# Patient Record
Sex: FEMALE | Race: WHITE | Employment: UNEMPLOYED | URBAN - METROPOLITAN AREA
[De-identification: names, ages, dates, MRNs, and addresses within clinical notes are randomized per-mention and may not be internally consistent; named-entity substitution may affect disease eponyms.]

---

## 2021-12-27 LAB — POC BETA-HCG (QUAL): POSITIVE

## 2022-02-09 LAB
BASOPHILS # BLD AUTO: 0.01 10*3/UL (ref 0.01–0.08)
BASOPHILS NFR BLD AUTO: 0.2 % (ref 0.1–1.2)
EOSINOPHIL # BLD AUTO: 0.09 10*3/UL (ref 0.04–0.36)
EOSINOPHIL NFR BLD AUTO: 1.4 % (ref 0.7–7)
ERYTHROCYTE [DISTWIDTH] IN BLOOD BY AUTOMATED COUNT: 14.6 % (ref 11–14.5)
HCT VFR BLD AUTO: 38.8 % (ref 36–48)
HGB BLD-MCNC: 12.5 G/DL (ref 11.8–16)
IMM GRANULOCYTES # BLD AUTO: 0.01 10*3/UL (ref 0–0.03)
IMM GRANULOCYTES NFR BLD AUTO: 0.2 % (ref 0–0.5)
LYMPHOCYTES # BLD AUTO: 1.59 10*3/UL (ref 1.16–3.74)
LYMPHOCYTES NFR BLD AUTO: 24.2 % (ref 20–55)
MANUAL DIFF? (OHS): NORMAL
MCH RBC QN AUTO: 26.3 PG (ref 27–34)
MCHC RBC AUTO-ENTMCNC: 32.2 G/DL (ref 31–37)
MCV RBC AUTO: 81.7 FL (ref 79–99)
MONOCYTES # BLD AUTO: 0.47 10*3/UL (ref 0.24–0.36)
MONOCYTES NFR BLD AUTO: 7.1 % (ref 4.7–12.5)
NEUTROPHILS # BLD AUTO: 4.41 10*3/UL (ref 1.56–6.13)
NEUTROPHILS NFR BLD AUTO: 66.9 % (ref 37–73)
PLATELET # BLD AUTO: 248 10*3/UL (ref 140–371)
PMV BLD AUTO: 9.7 FL (ref 9.4–12.4)
RBC # BLD AUTO: 4.75 10*6/UL (ref 4–5.1)
WBC # SPEC AUTO: 6.6 10*3/UL (ref 4–11.5)

## 2022-04-06 LAB
BASOPHILS # BLD AUTO: 0.01 10*3/UL (ref 0.01–0.08)
BASOPHILS NFR BLD AUTO: 0.2 % (ref 0.1–1.2)
EOSINOPHIL # BLD AUTO: 0.07 10*3/UL (ref 0.04–0.36)
EOSINOPHIL NFR BLD AUTO: 1.7 % (ref 0.7–7)
ERYTHROCYTE [DISTWIDTH] IN BLOOD BY AUTOMATED COUNT: 14.6 % (ref 11–14.5)
HCT VFR BLD AUTO: 32.9 % (ref 36–48)
HGB BLD-MCNC: 10.4 G/DL (ref 11.8–16)
IMM GRANULOCYTES # BLD AUTO: 0.01 10*3/UL (ref 0–0.03)
IMM GRANULOCYTES NFR BLD AUTO: 0.2 % (ref 0–0.5)
LYMPHOCYTES # BLD AUTO: 1.48 10*3/UL (ref 1.16–3.74)
LYMPHOCYTES NFR BLD AUTO: 36 % (ref 20–55)
MANUAL DIFF? (OHS): NORMAL
MCH RBC QN AUTO: 25.7 PG (ref 27–34)
MCHC RBC AUTO-ENTMCNC: 31.6 G/DL (ref 31–37)
MCV RBC AUTO: 81.4 FL (ref 79–99)
MONOCYTES # BLD AUTO: 0.27 10*3/UL (ref 0.24–0.36)
MONOCYTES NFR BLD AUTO: 6.6 % (ref 4.7–12.5)
NEUTROPHILS # BLD AUTO: 2.27 10*3/UL (ref 1.56–6.13)
NEUTROPHILS NFR BLD AUTO: 55.3 % (ref 37–73)
PLATELET # BLD AUTO: 267 10*3/UL (ref 140–371)
PMV BLD AUTO: 9.2 FL (ref 9.4–12.4)
RBC # BLD AUTO: 4.04 10*6/UL (ref 4–5.1)
WBC # SPEC AUTO: 4.1 10*3/UL (ref 4–11.5)

## 2022-04-11 ENCOUNTER — HISTORICAL (OUTPATIENT)
Dept: ADMINISTRATIVE | Facility: HOSPITAL | Age: 22
End: 2022-04-11

## 2022-04-29 VITALS
SYSTOLIC BLOOD PRESSURE: 118 MMHG | BODY MASS INDEX: 38.8 KG/M2 | DIASTOLIC BLOOD PRESSURE: 68 MMHG | HEIGHT: 63 IN | WEIGHT: 219 LBS

## 2022-05-14 ENCOUNTER — HISTORICAL (OUTPATIENT)
Dept: ADMINISTRATIVE | Facility: HOSPITAL | Age: 22
End: 2022-05-14

## 2022-05-21 NOTE — HISTORICAL [facility]
This is a historical note converted from Iveth. Formatting and pictures may have been removed.  Please reference Iveth for original formatting and attached multimedia. Chief Complaint  PRESENTS TO CLINIC 10 WEEKS AND 5 DAYS FOR OB VISIT  History of Present Illness  PRESENTS TO CLINIC 10 WEEKS AND 5 DAYS FOR OB VISIT. C/O NAUSEA AND VOMITING IN AM  LMP/EGA/RAJEEV  Gestational Age (EGA) and RAJEEV?? ? * Note: EGA calculated as of 02/09/2022  ?  RAJEEV:?09/02/2022???EGA*:?10 weeks 5 days ? ? ? ? ? ?Type:?Authoritative??????Method Date:?11/26/2021  ?  ?? ? ?Method:?Last Menstrual Period?(11/26/2021)  ?? ? ?Confirmation:?Confirmed  ?? ? ?Description:?--  ?? ? ?Comments:?--  ?? ? ?Entered by:?Hetal Platt on 01/19/2022?  ?  Other RAJEEV Calculations for this Pregnancy:  ?  ?? ? ?Method:?Ultrasound  ?? ? ?Method Date:?01/19/2022  ?? ? ?RAJEEV:?08/31/2022  ?? ? ?EGA (At Entry):?8 weeks  ?? ? ?Type:?Non-Authoritative  ?? ? ?Comments:?SINGLE LIVE IUP AT 8W0D, FHTS-169, C/W DATES BY LMP  ?? ? ?Entered by:?Pearl Ellis MD, Keith JACQUES on 01/19/2022  Gynecological History  Last Menstrual Period: 11/26/21  Menstrual Status Intake: Menarcheal  Age of Menarche: 15  STIs/STDs: No  Intermenstrual Bleeding: Yes  Current Birth Control Method: None  Dysmenorrhea: Severe  HPV Vaccine: No  Flow: Heavy  Dyspareunia: No  Duration of Flow: IRREGULAR  Postcoital Bleeding: No  Frequency of Cycle: IRREGULAR  Dysuria: No  Breast CA Hx: No  Sexually Active: Yes  Review of Systems  Pertinent findings are noted in the above HPI. All other systems were reviewed and are negative.?  Physical Exam  Vitals & Measurements  HR:?114(Peripheral)? BP:?125/70?  HT:?160.02?cm? WT:?100.300?kg?  General Appearance: healthy, well-nourished and well-developed in no acute distress.  Skin: normal  Neuro/Psych: Orientation to time, place and person. Normal mood and affect and active, alert  ?  Assessment/Plan  1.?Encounter for supervision of normal first pregnancy, first  trimester?Z34.01  ?INSTR PT TO TAKE 2 4MG ZOFRAN Q 8 HOURS. REPORTS NAUSEA IN AM WHEN SHE WAKES UP AT 6AM WHEN  LEAVES AND NAUSEA AGAIN WHEN SHE WAKES UP AROUND 8 OR 9AM, INSTR PT TO TAKE AT 10PM AT BEDTIME, AGAIN AROUND 6AM WHEN  LEAVES, TO HELP NAUSEA UPON WAKING. IF NO RELIEF CONTACT OFFICE AND WE WILL TRY ANOTHER MEDICATION.  Ordered:  Clinic Follow up, *Est. 22 3:00:00 CST, Order for future visit, Encounter for supervision of normal first pregnancy, first trimester  10 weeks gestation of pregnancy, FLORIAN TORRES  Office/Outpatient Visit Level 3 Established 21735 , Encounter for supervision of normal first pregnancy, first trimester  10 weeks gestation of pregnancy, FLORIAN TORRES , 22 14:23:00 CST  ?  2.?10 weeks gestation of pregnancy?Z3A.10  Ordered:  Clinic Follow up, *Est. 22 3:00:00 CST, Order for future visit, Encounter for supervision of normal first pregnancy, first trimester  10 weeks gestation of pregnancy, FLORIAN TORRES  Office/Outpatient Visit Level 3 Established 42000 , Encounter for supervision of normal first pregnancy, first trimester  10 weeks gestation of pregnancy, FLORIAN TORRES , 22 14:23:00 CST  ?  Referrals  Clinic Follow up, *Est. 22 3:00:00 CST, Order for future visit, Encounter for supervision of normal first pregnancy, first trimester  10 weeks gestation of pregnancy, BLAKE Haleigh TORRES    History  Pregnancy History???(0,0,0,0)?? ??  ?  ??No previous pregnancies history have been recorded  Problem List/Past Medical History  Ongoing  Encounter for pregnancy test, result positive  Encounter for supervision of normal first pregnancy, first trimester  Nausea/vomiting in pregnancy  Pregnant  Historical  Asthma  Depression  Migraine  Medications  Vitafol Gummies with DHA oral tablet, chewable, 3 tab(s), Chewed, Daily, 5 refills  Zofran ODT 4 mg oral tablet, disintegrating, 4 mg= 1 tab(s), Oral, q8hr,  PRN  Allergies  No Known Medication Allergies  Social History  Abuse/Neglect  No, No, Yes, 02/09/2022  Alcohol  Never, 02/09/2022  Sexual  Sexually active: Yes. No, 02/09/2022  Substance Use  Never, 02/09/2022  Tobacco  Never (less than 100 in lifetime), N/A, 02/09/2022  Family History  Family history is negative  Health Maintenance  Health Maintenance  ???Pending?(in the next year)  ??? ??OverDue  ??? ? ? ?Influenza Vaccine due??10/01/21??and every 1??day(s)  ??? ??Due?  ??? ? ? ?ADL Screening due??02/09/22??and every 1??year(s)  ??? ? ? ?Depression Screening due??02/09/22??Unknown Frequency  ??? ? ? ?Tetanus Vaccine due??02/09/22??and every 10??year(s)  ??? ??Refused?  ??? ? ? ?Alcohol Misuse Screening due??01/02/22??and every 1??year(s)  ??? ??Due In Future?  ??? ? ? ?Obesity Screening not due until??01/01/23??and every 1??year(s)  ???Satisfied?(in the past 1 year)  ??? ??Satisfied?  ??? ? ? ?Blood Pressure Screening on??02/09/22.??Satisfied by Sirisha Us  ??? ? ? ?Body Mass Index Check on??01/19/22.??Satisfied by Hetal Platt  ??? ? ? ?Influenza Vaccine on??12/27/21.??Satisfied by Tiara Lewis  ??? ? ? ?Obesity Screening on??02/09/22.??Satisfied by Sirisha Us  ??? ??Refused?  ??? ? ? ?Alcohol Misuse Screening on??12/27/21.??Recorded by Tiara Lewis  ?

## 2022-09-22 ENCOUNTER — HISTORICAL (OUTPATIENT)
Dept: ADMINISTRATIVE | Facility: HOSPITAL | Age: 22
End: 2022-09-22

## 2022-10-05 LAB
C TRACH DNA SPEC QL NAA+PROBE: NEGATIVE
N GONORRHOEA DNA SPEC QL NAA+PROBE: NEGATIVE

## 2022-10-06 LAB
BENZODIAZEPINES: NEGATIVE
CLARITHRO TITR SBT: NORMAL {TITER}
COCAINE (METAB.): NEGATIVE
HCV AB SERPL QL IA: NEGATIVE
MARIJUANA (THC) METABOLITE: NEGATIVE
METHADONE: NEGATIVE
OPIATES UR QL SCN: NEGATIVE
URINE CULTURE, ROUTINE: NORMAL

## 2022-11-09 LAB
ABO + RH BLD: NORMAL
EXT MATERNIT21: NEGATIVE
GLUCOSE SERPL-MCNC: 85 MG/DL
GLUCOSE SERPL-MCNC: 85 MG/DL
HBV SURFACE AG SERPL QL IA: NEGATIVE
HCT VFR BLD AUTO: 36.7 % (ref 36–46)
HGB BLD-MCNC: 11.8 G/DL (ref 12–16)
HIV-1 AND HIV-2 ANTIBODIES: NONREACTIVE
INDIRECT COOMBS: NEGATIVE
MCV RBC AUTO: 77.3 FL (ref 82–108)
PLATELET # BLD AUTO: 236 K/UL (ref 150–399)
RPR: NONREACTIVE
RUBELLA IMMUNE STATUS: NORMAL

## 2022-12-28 VITALS
SYSTOLIC BLOOD PRESSURE: 120 MMHG | DIASTOLIC BLOOD PRESSURE: 60 MMHG | BODY MASS INDEX: 40.23 KG/M2 | WEIGHT: 227.13 LBS

## 2022-12-28 LAB — GLUCOSE 1: 85

## 2022-12-28 RX ORDER — PROGESTERONE 200 MG/1
200 CAPSULE ORAL NIGHTLY
COMMUNITY
End: 2023-03-21

## 2022-12-28 RX ORDER — ASPIRIN 81 MG/1
81 TABLET ORAL DAILY
COMMUNITY
End: 2023-03-15 | Stop reason: SDUPTHER

## 2022-12-29 VITALS
SYSTOLIC BLOOD PRESSURE: 112 MMHG | WEIGHT: 224.88 LBS | DIASTOLIC BLOOD PRESSURE: 66 MMHG | BODY MASS INDEX: 39.83 KG/M2

## 2023-01-25 ENCOUNTER — ROUTINE PRENATAL (OUTPATIENT)
Dept: OBSTETRICS AND GYNECOLOGY | Facility: CLINIC | Age: 23
End: 2023-01-25
Payer: MEDICAID

## 2023-01-25 VITALS
BODY MASS INDEX: 41.63 KG/M2 | HEART RATE: 86 BPM | DIASTOLIC BLOOD PRESSURE: 70 MMHG | WEIGHT: 235 LBS | SYSTOLIC BLOOD PRESSURE: 118 MMHG

## 2023-01-25 DIAGNOSIS — Z34.82 ENCOUNTER FOR SUPERVISION OF OTHER NORMAL PREGNANCY IN SECOND TRIMESTER: Primary | ICD-10-CM

## 2023-01-25 DIAGNOSIS — O99.213 OBESITY COMPLICATING PREGNANCY IN THIRD TRIMESTER: ICD-10-CM

## 2023-01-25 DIAGNOSIS — Z3A.22 22 WEEKS GESTATION OF PREGNANCY: ICD-10-CM

## 2023-01-25 DIAGNOSIS — O34.32 INCOMPETENT CERVIX IN PREGNANCY, ANTEPARTUM, SECOND TRIMESTER: ICD-10-CM

## 2023-01-25 DIAGNOSIS — E88.819 INSULIN RESISTANCE COMPLICATING PREGNANCY: ICD-10-CM

## 2023-01-25 DIAGNOSIS — O26.899 INSULIN RESISTANCE COMPLICATING PREGNANCY: ICD-10-CM

## 2023-01-25 PROBLEM — Z34.92 ENCOUNTER FOR SUPERVISION OF NORMAL PREGNANCY IN SECOND TRIMESTER: Status: ACTIVE | Noted: 2023-01-25

## 2023-01-25 LAB
BILIRUB SERPL-MCNC: NEGATIVE MG/DL
BLOOD URINE, POC: NEGATIVE
CLARITY, POC UA: CLEAR
COLOR, POC UA: YELLOW
GLUCOSE UR QL STRIP: NEGATIVE
KETONES UR QL STRIP: NEGATIVE
LEUKOCYTE ESTERASE URINE, POC: NEGATIVE
NITRITE, POC UA: NEGATIVE
PH, POC UA: 7.5
PROTEIN, POC: NORMAL
SPECIFIC GRAVITY, POC UA: 1.02
UROBILINOGEN, POC UA: 1

## 2023-01-25 PROCEDURE — 76815 OB US LIMITED FETUS(S): CPT | Mod: ,,, | Performed by: OBSTETRICS & GYNECOLOGY

## 2023-01-25 PROCEDURE — 99213 PR OFFICE/OUTPT VISIT, EST, LEVL III, 20-29 MIN: ICD-10-PCS | Mod: 25,TH,, | Performed by: OBSTETRICS & GYNECOLOGY

## 2023-01-25 PROCEDURE — 76815 PR  US,PREGNANT UTERUS,LIMITED, 1/> FETUSES: ICD-10-PCS | Mod: ,,, | Performed by: OBSTETRICS & GYNECOLOGY

## 2023-01-25 PROCEDURE — 99213 OFFICE O/P EST LOW 20 MIN: CPT | Mod: 25,TH,, | Performed by: OBSTETRICS & GYNECOLOGY

## 2023-01-26 NOTE — PROGRESS NOTES
HPI  Chiquita Montelongo is a 22 y.o.  22w3d here for Routine Prenatal Visit (C/O nausea in mornings)  DENIES ANY VAGINAL BLEEDING OR PAIN. ACTIVE FM. SOME DISCHARGE, BUT WITHOUT ODOR OR IRRITATION.     Leahs allergies, medications, history, and problem list were updated as appropriate.    ROS:  A comprehensive review of symptoms was completed and negative except as noted above.      Physical Exam:  /70   Pulse 86   Wt 106.6 kg (235 lb 0.2 oz)   LMP 2022 (Approximate)   BMI 41.63 kg/m²   FHTS-152  Gen: No distress  Abdomen: Gravid, non-tender  Extremities: No edema    TVUS: CVX LENGTH: 2.55, 2.81, 3.27 CM    ASSESSMENT/PLAN:  1. Encounter for supervision of other normal pregnancy in second trimester    2. 22 weeks gestation of pregnancy  -     POCT URINE DIPSTICK WITHOUT MICROSCOPE  -     US OB/GYN Procedure ([facility]) - Extended List - Future    3. Incompetent cervix in pregnancy, antepartum, second trimester  -     US OB/GYN Procedure ([facility]) - Extended List - Future    4. Insulin resistance complicating pregnancy    5. Obesity complicating pregnancy in third trimester         Recent Results (from the past 24 hour(s))   POCT URINE DIPSTICK WITHOUT MICROSCOPE    Collection Time: 23  2:04 PM   Result Value Ref Range    Color, UA Yellow     pH, UA 7.5     WBC, UA negative     Nitrite, UA negative     Protein, POC trace     Glucose, UA negative     Ketones, UA negative     Urobilinogen, UA 1.0     Bilirubin, POC negative     Blood, UA negative     Clarity, UA Clear     Spec Grav UA 1.020        Prec given    Follow Up:  Follow up in about 4 weeks (around 2023) for OB F/U AND CERVICAL LENGTH.

## 2023-02-02 ENCOUNTER — TELEPHONE (OUTPATIENT)
Dept: OBSTETRICS AND GYNECOLOGY | Facility: CLINIC | Age: 23
End: 2023-02-02
Payer: MEDICAID

## 2023-02-02 NOTE — TELEPHONE ENCOUNTER
RETURNED PT.'S CALL.  CONFIRMED. PT STATED DR. HUNTER PLACED HER ON INDOCIN AND WAS JUST WANTING A SECOND OPINION FROM DR. WOODS. SPOKE WITH DR. WOODS REGARDING CONVERSATION. INSTRUCTED HER DR. WOODS KNOWS OF NO SIDE EFFECTS THAT WOULD HARM HER AND REASSURED DR. HUNTER IS A SPECIALIST IN [facility].

## 2023-02-22 ENCOUNTER — ROUTINE PRENATAL (OUTPATIENT)
Dept: OBSTETRICS AND GYNECOLOGY | Facility: CLINIC | Age: 23
End: 2023-02-22
Payer: MEDICAID

## 2023-02-22 VITALS
BODY MASS INDEX: 43.19 KG/M2 | WEIGHT: 243.81 LBS | SYSTOLIC BLOOD PRESSURE: 118 MMHG | HEART RATE: 90 BPM | DIASTOLIC BLOOD PRESSURE: 64 MMHG

## 2023-02-22 DIAGNOSIS — O99.213 OBESITY COMPLICATING PREGNANCY IN THIRD TRIMESTER: ICD-10-CM

## 2023-02-22 DIAGNOSIS — Z34.90 PREGNANCY, UNSPECIFIED GESTATIONAL AGE: ICD-10-CM

## 2023-02-22 DIAGNOSIS — N88.3 INCOMPETENT CERVIX: ICD-10-CM

## 2023-02-22 DIAGNOSIS — Z34.82 ENCOUNTER FOR SUPERVISION OF OTHER NORMAL PREGNANCY IN SECOND TRIMESTER: ICD-10-CM

## 2023-02-22 DIAGNOSIS — O34.32 INCOMPETENT CERVIX IN PREGNANCY, ANTEPARTUM, SECOND TRIMESTER: Primary | ICD-10-CM

## 2023-02-22 DIAGNOSIS — E88.819 INSULIN RESISTANCE COMPLICATING PREGNANCY: ICD-10-CM

## 2023-02-22 DIAGNOSIS — O26.899 INSULIN RESISTANCE COMPLICATING PREGNANCY: ICD-10-CM

## 2023-02-22 LAB
BASOPHILS # BLD AUTO: 0.02 X10(3)/MCL (ref 0.01–0.08)
BASOPHILS NFR BLD AUTO: 0.2 % (ref 0.1–1.2)
BILIRUB UR QL STRIP: NEGATIVE
EOSINOPHIL # BLD AUTO: 0.07 X10(3)/MCL (ref 0.04–0.36)
EOSINOPHIL NFR BLD AUTO: 0.8 % (ref 0.7–7)
ERYTHROCYTE [DISTWIDTH] IN BLOOD BY AUTOMATED COUNT: 15.9 % (ref 11–14.5)
GLUCOSE UR QL STRIP: NEGATIVE
HCT VFR BLD AUTO: 32.8 % (ref 36–48)
HGB BLD-MCNC: 10.2 G/DL (ref 11.8–16)
IMM GRANULOCYTES # BLD AUTO: 0.07 X10(3)/MCL (ref 0–0.03)
IMM GRANULOCYTES NFR BLD AUTO: 0.8 % (ref 0–0.5)
KETONES UR QL STRIP: NEGATIVE
LEUKOCYTE ESTERASE UR QL STRIP: NEGATIVE
LYMPHOCYTES # BLD AUTO: 1.92 X10(3)/MCL (ref 1.16–3.74)
LYMPHOCYTES NFR BLD AUTO: 20.8 % (ref 20–55)
MCH RBC QN AUTO: 23.9 PG (ref 27–34)
MCV RBC AUTO: 77 FL (ref 79–99)
MEAN CELL HEMOGLOBIN CONCENTRATION (OHS) G/DL: 31.1 G/DL (ref 31–37)
MONOCYTES # BLD AUTO: 0.88 X10(3)/MCL (ref 0.24–0.36)
MONOCYTES NFR BLD AUTO: 9.5 % (ref 4.7–12.5)
NEUTROPHILS # BLD AUTO: 6.26 X10(3)/MCL (ref 1.56–6.13)
NEUTROPHILS NFR BLD AUTO: 67.9 % (ref 37–73)
NRBC BLD AUTO-RTO: 0 % (ref 0–1)
PH, POC UA: 6
PLATELET # BLD AUTO: 257 X10(3)/MCL (ref 140–371)
PMV BLD AUTO: 9.2 FL (ref 9.4–12.4)
POC BLOOD, URINE: NEGATIVE
POC NITRATES, URINE: NEGATIVE
PROT UR QL STRIP: POSITIVE
RBC # BLD AUTO: 4.26 X10(6)/MCL (ref 4–5.1)
SP GR UR STRIP: 1.02 (ref 1–1.03)
UROBILINOGEN UR STRIP-ACNC: 1 (ref 0.1–1.1)
WBC # SPEC AUTO: 9.2 X10(3)/MCL (ref 4–11.5)

## 2023-02-22 PROCEDURE — 99214 PR OFFICE/OUTPT VISIT, EST, LEVL IV, 30-39 MIN: ICD-10-PCS | Mod: 25,TH,, | Performed by: OBSTETRICS & GYNECOLOGY

## 2023-02-22 PROCEDURE — 76817 PR US, OB, TRANSVAG APPROACH: ICD-10-PCS | Mod: 26,,, | Performed by: OBSTETRICS & GYNECOLOGY

## 2023-02-22 PROCEDURE — 85025 COMPLETE CBC W/AUTO DIFF WBC: CPT | Performed by: OBSTETRICS & GYNECOLOGY

## 2023-02-22 PROCEDURE — 76817 TRANSVAGINAL US OBSTETRIC: CPT | Mod: 26,,, | Performed by: OBSTETRICS & GYNECOLOGY

## 2023-02-22 PROCEDURE — 99214 OFFICE O/P EST MOD 30 MIN: CPT | Mod: 25,TH,, | Performed by: OBSTETRICS & GYNECOLOGY

## 2023-02-22 RX ORDER — DOCONEXENT, NIACINAMIDE, .ALPHA.-TOCOPHEROL ACETATE, DL-, CHOLECALCIFEROL, .BETA.-CAROTENE, ASCORBIC ACID, THIAMINE MONONITRATE, RIBOFLAVIN, PYRIDOXINE HYDROCHLORIDE, CYANOCOBALAMIN, IRON, ZINC OXIDE, CUPRIC OXIDE, POTASSIUM IODIDE, MAGNESIUM OXIDE, FOLIC ACID, AND LEVOMEFOLATE CALCIUM 200; 15; 20; 1000; 1100; 30; 1.6; 1.8; 2.5; 12; 29; 25; 2; 150; 20; .4; .6 MG/1; MG/1; [IU]/1; [IU]/1; [IU]/1; MG/1; MG/1; MG/1; MG/1; UG/1; MG/1; MG/1; MG/1; UG/1; MG/1; MG/1; MG/1
1 CAPSULE, LIQUID FILLED ORAL
COMMUNITY
Start: 2023-02-20 | End: 2023-03-24 | Stop reason: SDUPTHER

## 2023-02-24 DIAGNOSIS — D64.9 ANEMIA, UNSPECIFIED TYPE: Primary | ICD-10-CM

## 2023-02-24 RX ORDER — IRON,CARB/VIT C/VIT B12/FOLIC 100-250-1
1 TABLET ORAL DAILY
Qty: 30 TABLET | Refills: 5 | Status: ON HOLD | OUTPATIENT
Start: 2023-02-24 | End: 2023-05-03 | Stop reason: HOSPADM

## 2023-02-28 ENCOUNTER — DOCUMENTATION ONLY (OUTPATIENT)
Dept: OBSTETRICS AND GYNECOLOGY | Facility: CLINIC | Age: 23
End: 2023-02-28
Payer: MEDICAID

## 2023-02-28 NOTE — PROGRESS NOTES
\[facility] [place] [facility]\""  Chiquita Montelongo is a 22 y.o.   26 weeks and 3 days FOR PRENATAL VISIT.   NO COMPLAINTS TODAY, NO VB OR ROM, NO CTXS OR PAIN.  ON PELVIC REST AND MODIFIED BEDREST, USING VAGINAL PROGESTERONE AS DIRECTED.  HAD CERVICAL LENGTH DONE AT [facility] OFFICE LAST WEEK WAS BETWEEN 1.7-1.9 CM WITH FUNNELING BUT NOT PAST THE CERCLAGE STITCH.      Leahs allergies, medications, history, and problem list were updated as appropriate.    ROS:  A comprehensive review of symptoms was completed and negative except as noted above.    Physical Exam:  /64   Pulse 90   Wt 110.6 kg (243 lb 13.3 oz)   LMP 2022 (Approximate)   BMI 43.19 kg/m²     Gen: No distress  Abdomen: Gravid, non-tender  Extremities: No edema    Fundal Height (cm): 26 cm  Fetal Heart Rate: 141  Movement: Present  Presentation: Vertex     CVX LENGTH: BETWEEN 2.2-3.2    Chaperone Present  ASSESSMENT/PLAN:  1. Incompetent cervix in pregnancy, antepartum, second trimester  -     CBC Auto Differential   CONT PELVIC REST, BED REST, VAGINAL PROGESTERONE, HAS [facility] FOLLOW UP NEXT WEEK    2. Pregnancy, unspecified gestational age  -     POCT Urinalysis, Dipstick, Automated, W/O Scope  -     Cancel: US OB/GYN Procedure ([facility]point) - Extended List - Future    3. Incompetent cervix  -     US OB/GYN Procedure ([facility]point) - Extended List - Future    4. Obesity complicating pregnancy in third trimester  -     CBC Auto Differential    5. Insulin resistance complicating pregnancy   NEEDS ONE HR GTT AT 28 WKS    6. Encounter for supervision of other normal pregnancy in second trimester         No results found for this or any previous visit (from the past 24 hour(s)).    Labor unit, Kick Counts, and Pre-eclampsia given  Chaperone Present  Follow Up:  Follow up in about 2 weeks (around 3/8/2023) for OB F/U.

## 2023-03-13 ENCOUNTER — TELEPHONE (OUTPATIENT)
Dept: OBSTETRICS AND GYNECOLOGY | Facility: CLINIC | Age: 23
End: 2023-03-13
Payer: MEDICAID

## 2023-03-13 NOTE — TELEPHONE ENCOUNTER
----- Message from Niru Garcia sent at 3/13/2023  7:32 AM CDT -----  Regarding: meds  PT CALLED THE ANSWERING SERVICE ON Saturday AFTERNOON  WANTING TO KNOW IF SHE SHOULD CONTINUE HER MEDICATION. DID NOT SAY ON MESSAGE WHICH MEDS. [phone].

## 2023-03-15 ENCOUNTER — ROUTINE PRENATAL (OUTPATIENT)
Dept: OBSTETRICS AND GYNECOLOGY | Facility: CLINIC | Age: 23
End: 2023-03-15
Payer: MEDICAID

## 2023-03-15 VITALS
DIASTOLIC BLOOD PRESSURE: 82 MMHG | WEIGHT: 247.25 LBS | HEART RATE: 98 BPM | BODY MASS INDEX: 43.8 KG/M2 | SYSTOLIC BLOOD PRESSURE: 120 MMHG

## 2023-03-15 DIAGNOSIS — Z34.93 ENCOUNTER FOR SUPERVISION OF NORMAL PREGNANCY IN THIRD TRIMESTER, UNSPECIFIED GRAVIDITY: Primary | ICD-10-CM

## 2023-03-15 LAB
BASOPHILS # BLD AUTO: 0.01 X10(3)/MCL (ref 0.01–0.08)
BASOPHILS NFR BLD AUTO: 0.1 % (ref 0.1–1.2)
BILIRUB UR QL STRIP: NEGATIVE
EOSINOPHIL # BLD AUTO: 0.08 X10(3)/MCL (ref 0.04–0.36)
EOSINOPHIL NFR BLD AUTO: 1 % (ref 0.7–7)
ERYTHROCYTE [DISTWIDTH] IN BLOOD BY AUTOMATED COUNT: 19.1 % (ref 11–14.5)
GLUCOSE 1H P 100 G GLC PO SERPL-MCNC: 116 MG/DL
GLUCOSE UR QL STRIP: NEGATIVE
HCT VFR BLD AUTO: 36.2 % (ref 36–48)
HGB BLD-MCNC: 10.8 G/DL (ref 11.8–16)
IMM GRANULOCYTES # BLD AUTO: 0.03 X10(3)/MCL (ref 0–0.03)
IMM GRANULOCYTES NFR BLD AUTO: 0.4 % (ref 0–0.5)
KETONES UR QL STRIP: NEGATIVE
LEUKOCYTE ESTERASE UR QL STRIP: NEGATIVE
LYMPHOCYTES # BLD AUTO: 1.95 X10(3)/MCL (ref 1.16–3.74)
LYMPHOCYTES NFR BLD AUTO: 25.5 % (ref 20–55)
MCH RBC QN AUTO: 23.7 PG (ref 27–34)
MCV RBC AUTO: 79.6 FL (ref 79–99)
MEAN CELL HEMOGLOBIN CONCENTRATION (OHS) G/DL: 29.8 G/DL (ref 31–37)
MONOCYTES # BLD AUTO: 0.74 X10(3)/MCL (ref 0.24–0.36)
MONOCYTES NFR BLD AUTO: 9.7 % (ref 4.7–12.5)
NEUTROPHILS # BLD AUTO: 4.85 X10(3)/MCL (ref 1.56–6.13)
NEUTROPHILS NFR BLD AUTO: 63.3 % (ref 37–73)
NRBC BLD AUTO-RTO: 0 % (ref 0–1)
PH, POC UA: 7
PLATELET # BLD AUTO: 233 X10(3)/MCL (ref 140–371)
PMV BLD AUTO: 9.3 FL (ref 9.4–12.4)
POC BLOOD, URINE: NEGATIVE
POC NITRATES, URINE: NEGATIVE
PROT UR QL STRIP: NEGATIVE
RBC # BLD AUTO: 4.55 X10(6)/MCL (ref 4–5.1)
SP GR UR STRIP: 1.02 (ref 1–1.03)
UROBILINOGEN UR STRIP-ACNC: 0.2 (ref 0.1–1.1)
WBC # SPEC AUTO: 7.7 X10(3)/MCL (ref 4–11.5)

## 2023-03-15 PROCEDURE — 99213 OFFICE O/P EST LOW 20 MIN: CPT | Mod: TH,,, | Performed by: NURSE PRACTITIONER

## 2023-03-15 PROCEDURE — 86780 TREPONEMA PALLIDUM: CPT | Performed by: NURSE PRACTITIONER

## 2023-03-15 PROCEDURE — 82950 GLUCOSE TEST: CPT | Performed by: NURSE PRACTITIONER

## 2023-03-15 PROCEDURE — 85025 COMPLETE CBC W/AUTO DIFF WBC: CPT | Performed by: NURSE PRACTITIONER

## 2023-03-15 PROCEDURE — 99213 PR OFFICE/OUTPT VISIT, EST, LEVL III, 20-29 MIN: ICD-10-PCS | Mod: TH,,, | Performed by: NURSE PRACTITIONER

## 2023-03-15 RX ORDER — ASPIRIN 81 MG/1
81 TABLET ORAL DAILY
Qty: 30 TABLET | Refills: 5 | Status: ON HOLD | OUTPATIENT
Start: 2023-03-15 | End: 2023-05-03 | Stop reason: HOSPADM

## 2023-03-15 NOTE — PROGRESS NOTES
HPI  Chiquita Montelongo is a 22 y.o.   29w3d here for Routine Prenatal Visit (ONE HOUR,CBC RPR TODAY.NO C/O'S.)       Eric allergies, medications, history, and problem list were updated as appropriate.    ROS:  A comprehensive review of symptoms was completed and negative except as noted above.    Physical Exam:  /82   Pulse (!) 119 Comment: recheck 98  Wt 112.1 kg (247 lb 3.9 oz)   LMP 2022 (Approximate)   BMI 43.80 kg/m²     Gen: No distress  Abdomen: Gravid, non-tender  Extremities: No edema    Fundal Height (cm): 29 cm  Fetal Heart Rate: 138  Movement: Present  Recent Results (from the past 24 hour(s))   POCT Urinalysis, Dipstick, Automated, W/O Scope    Collection Time: 03/15/23 11:25 AM   Result Value Ref Range    POC Blood, Urine Negative Negative    POC Bilirubin, Urine Negative Negative    POC Urobilinogen, Urine 0.2 0.1 - 1.1    POC Ketones, Urine Negative Negative    POC Protein, Urine Negative Negative    POC Nitrates, Urine Negative Negative    POC Glucose, Urine Negative Negative    pH, UA 7.0     POC Specific Gravity, Urine 1.020 1.003 - 1.029    POC Leukocytes, Urine Negative Negative     Chaperone Present  ASSESSMENT/PLAN:  1. Encounter for supervision of normal pregnancy in third trimester, unspecified   -     CBC Auto Differential; Future; Expected date: 03/15/2023  -     SYPHILIS ANTIBODY (WITH REFLEX RPR); Future; Expected date: 03/15/2023  -     GTT 1 Hour; Future; Expected date: 03/15/2023  -     POCT Urinalysis, Dipstick, Automated, W/O Scope  -     aspirin (ECOTRIN) 81 MG EC tablet; Take 1 tablet (81 mg total) by mouth once daily.  Dispense: 30 tablet; Refill: 5      Labor unit, Kick Counts, and Pre-eclampsia given  Chaperone Present  Follow Up:  No follow-ups on file.

## 2023-03-16 LAB — T PALLIDUM AB SER QL: NONREACTIVE

## 2023-03-24 DIAGNOSIS — Z34.93 PREGNANT AND NOT YET DELIVERED IN THIRD TRIMESTER: Primary | ICD-10-CM

## 2023-03-24 RX ORDER — DOCONEXENT, NIACINAMIDE, .ALPHA.-TOCOPHEROL ACETATE, DL-, CHOLECALCIFEROL, .BETA.-CAROTENE, ASCORBIC ACID, THIAMINE MONONITRATE, RIBOFLAVIN, PYRIDOXINE HYDROCHLORIDE, CYANOCOBALAMIN, IRON, ZINC OXIDE, CUPRIC OXIDE, POTASSIUM IODIDE, MAGNESIUM OXIDE, FOLIC ACID, AND LEVOMEFOLATE CALCIUM 200; 15; 20; 1000; 1100; 30; 1.6; 1.8; 2.5; 12; 29; 25; 2; 150; 20; .4; .6 MG/1; MG/1; [IU]/1; [IU]/1; [IU]/1; MG/1; MG/1; MG/1; MG/1; UG/1; MG/1; MG/1; MG/1; UG/1; MG/1; MG/1; MG/1
1 CAPSULE, LIQUID FILLED ORAL DAILY
Qty: 30 CAPSULE | Refills: 4 | Status: SHIPPED | OUTPATIENT
Start: 2023-03-24 | End: 2023-03-28 | Stop reason: SDUPTHER

## 2023-03-28 DIAGNOSIS — Z34.93 PREGNANT AND NOT YET DELIVERED IN THIRD TRIMESTER: ICD-10-CM

## 2023-03-28 RX ORDER — DOCONEXENT, NIACINAMIDE, .ALPHA.-TOCOPHEROL ACETATE, DL-, CHOLECALCIFEROL, .BETA.-CAROTENE, ASCORBIC ACID, THIAMINE MONONITRATE, RIBOFLAVIN, PYRIDOXINE HYDROCHLORIDE, CYANOCOBALAMIN, IRON, ZINC OXIDE, CUPRIC OXIDE, POTASSIUM IODIDE, MAGNESIUM OXIDE, FOLIC ACID, AND LEVOMEFOLATE CALCIUM 200; 15; 20; 1000; 1100; 30; 1.6; 1.8; 2.5; 12; 29; 25; 2; 150; 20; .4; .6 MG/1; MG/1; [IU]/1; [IU]/1; [IU]/1; MG/1; MG/1; MG/1; MG/1; UG/1; MG/1; MG/1; MG/1; UG/1; MG/1; MG/1; MG/1
1 CAPSULE, LIQUID FILLED ORAL DAILY
Qty: 30 CAPSULE | Refills: 4 | Status: SHIPPED | OUTPATIENT
Start: 2023-03-28

## 2023-04-12 ENCOUNTER — ROUTINE PRENATAL (OUTPATIENT)
Dept: OBSTETRICS AND GYNECOLOGY | Facility: CLINIC | Age: 23
End: 2023-04-12
Payer: MEDICAID

## 2023-04-12 VITALS
DIASTOLIC BLOOD PRESSURE: 74 MMHG | SYSTOLIC BLOOD PRESSURE: 126 MMHG | WEIGHT: 259 LBS | HEART RATE: 86 BPM | BODY MASS INDEX: 45.88 KG/M2

## 2023-04-12 DIAGNOSIS — O34.32 INCOMPETENT CERVIX IN PREGNANCY, ANTEPARTUM, SECOND TRIMESTER: ICD-10-CM

## 2023-04-12 DIAGNOSIS — D64.9 ANEMIA, UNSPECIFIED TYPE: ICD-10-CM

## 2023-04-12 DIAGNOSIS — Z3A.33 33 WEEKS GESTATION OF PREGNANCY: Primary | ICD-10-CM

## 2023-04-12 DIAGNOSIS — O26.899 INSULIN RESISTANCE COMPLICATING PREGNANCY: ICD-10-CM

## 2023-04-12 DIAGNOSIS — Z34.93 ENCOUNTER FOR SUPERVISION OF NORMAL PREGNANCY IN THIRD TRIMESTER, UNSPECIFIED GRAVIDITY: ICD-10-CM

## 2023-04-12 DIAGNOSIS — E88.819 INSULIN RESISTANCE COMPLICATING PREGNANCY: ICD-10-CM

## 2023-04-12 LAB
BILIRUB UR QL STRIP: NEGATIVE
GLUCOSE UR QL STRIP: NEGATIVE
KETONES UR QL STRIP: NEGATIVE
LEUKOCYTE ESTERASE UR QL STRIP: NEGATIVE
PH, POC UA: 7
POC BLOOD, URINE: NEGATIVE
POC NITRATES, URINE: NEGATIVE
PROT UR QL STRIP: NEGATIVE
SP GR UR STRIP: 1.02 (ref 1–1.03)
UROBILINOGEN UR STRIP-ACNC: 0.2 (ref 0.1–1.1)

## 2023-04-12 PROCEDURE — 99214 PR OFFICE/OUTPT VISIT, EST, LEVL IV, 30-39 MIN: ICD-10-PCS | Mod: TH,,, | Performed by: OBSTETRICS & GYNECOLOGY

## 2023-04-12 PROCEDURE — 99214 OFFICE O/P EST MOD 30 MIN: CPT | Mod: TH,,, | Performed by: OBSTETRICS & GYNECOLOGY

## 2023-04-12 NOTE — PROGRESS NOTES
HPI  Chiquita Montelongo is a 22 y.o.   33w3d here for Routine Prenatal Visit (No C/Os. States missed last [facility] and wont be going back D/T no ride)  DENIES PRESSURE OR PAIN, NO CTXS  DENIES VB, ROM, PIH SXS  REPORTS ACTIVE FM.    Eric allergies, medications, history, and problem list were updated as appropriate.    ROS:  A comprehensive review of symptoms was completed and negative except as noted above.    Physical Exam:  /74   Pulse 86   Wt 117.5 kg (259 lb)   LMP 2022 (Approximate)   BMI 45.88 kg/m²      Gen: No distress  Abdomen: Gravid, non-tender  Extremities: No edema  Fundal Height (cm): 33 cm  Fetal Heart Rate: 154  Movement: Present     Recent Results (from the past 24 hour(s))   POCT Urinalysis, Dipstick, Automated, W/O Scope    Collection Time: 23 10:28 AM   Result Value Ref Range    POC Blood, Urine Negative Negative    POC Bilirubin, Urine Negative Negative    POC Urobilinogen, Urine 0.2 0.1 - 1.1    POC Ketones, Urine Negative Negative    POC Protein, Urine Negative Negative    POC Nitrates, Urine Negative Negative    POC Glucose, Urine Negative Negative    pH, UA 7.0     POC Specific Gravity, Urine 1.020 1.003 - 1.029    POC Leukocytes, Urine Negative Negative     Chaperone Present  ASSESSMENT/PLAN:  1. 33 weeks gestation of pregnancy  -     POCT Urinalysis, Dipstick, Automated, W/O Scope    2. Encounter for supervision of normal pregnancy in third trimester, unspecified     3. Incompetent cervix in pregnancy, antepartum, second trimester    4. Insulin resistance complicating pregnancy  U/S NEXT VS.     5. Anemia, unspecified type      Labor unit, Kick Counts, and Pre-eclampsia given  Chaperone Present  Follow Up:  Follow up in about 2 weeks (around 2023) for OB VS, US.

## 2023-04-27 ENCOUNTER — ROUTINE PRENATAL (OUTPATIENT)
Dept: OBSTETRICS AND GYNECOLOGY | Facility: CLINIC | Age: 23
End: 2023-04-27
Payer: MEDICAID

## 2023-04-27 VITALS
SYSTOLIC BLOOD PRESSURE: 128 MMHG | WEIGHT: 269.38 LBS | DIASTOLIC BLOOD PRESSURE: 64 MMHG | BODY MASS INDEX: 47.72 KG/M2 | HEART RATE: 86 BPM

## 2023-04-27 DIAGNOSIS — O26.10 LOW WEIGHT GAIN DURING PREGNANCY, ANTEPARTUM: ICD-10-CM

## 2023-04-27 DIAGNOSIS — Z34.93 ENCOUNTER FOR SUPERVISION OF NORMAL PREGNANCY IN THIRD TRIMESTER, UNSPECIFIED GRAVIDITY: Primary | ICD-10-CM

## 2023-04-27 DIAGNOSIS — Z3A.35 35 WEEKS GESTATION OF PREGNANCY: ICD-10-CM

## 2023-04-27 DIAGNOSIS — O34.33 INCOMPETENT CERVIX IN PREGNANCY, ANTEPARTUM, THIRD TRIMESTER: ICD-10-CM

## 2023-04-27 DIAGNOSIS — O99.213 OBESITY COMPLICATING PREGNANCY IN THIRD TRIMESTER: ICD-10-CM

## 2023-04-27 PROCEDURE — 76816 US OB/GYN EXTENDED PROCEDURE (VIEWPOINT): ICD-10-PCS | Mod: 26,,, | Performed by: OBSTETRICS & GYNECOLOGY

## 2023-04-27 PROCEDURE — 99214 OFFICE O/P EST MOD 30 MIN: CPT | Mod: 25,TH,, | Performed by: OBSTETRICS & GYNECOLOGY

## 2023-04-27 PROCEDURE — 87653 STREP B DNA AMP PROBE: CPT | Performed by: OBSTETRICS & GYNECOLOGY

## 2023-04-27 PROCEDURE — 99214 PR OFFICE/OUTPT VISIT, EST, LEVL IV, 30-39 MIN: ICD-10-PCS | Mod: 25,TH,, | Performed by: OBSTETRICS & GYNECOLOGY

## 2023-04-27 PROCEDURE — 76816 OB US FOLLOW-UP PER FETUS: CPT | Mod: 26,,, | Performed by: OBSTETRICS & GYNECOLOGY

## 2023-04-27 NOTE — H&P (VIEW-ONLY)
KISHA Montelongo is a 22 y.o.   35w4d here for Routine Prenatal Visit   PT PRESENTS TO CLINIC FOR OB US VISIT, OTHERWISE NO COMPLAINTS.  REPORTS ACTIVE FM, NO PIH SXS, DENIES VB, HAVING SOME BH CTXS    Leahs allergies, medications, history, and problem list were updated as appropriate.    ROS:  A comprehensive review of symptoms was completed and negative except as noted above.    Physical Exam:  /64   Pulse 86   Wt 122.2 kg (269 lb 6.4 oz)   LMP 2022 (Approximate)   BMI 47.72 kg/m²   Gen: No distress  Abdomen: Gravid, non-tender  Extremities: No edema  Fetal Heart Rate: 189  Movement: Present  Presentation: Vertex  Dilation: Closed  Effacement (%): 0  Station: -3     GBS VAGINAL-RECTAL CULTURE COLLECTED     U/S: VIABLE MALE, VTX, EFW=7#2OZ, TRAM=12CM    Recent Results (from the past 24 hour(s))   POCT Urinalysis, Dipstick, Automated, W/O Scope    Collection Time: 23 10:59 AM   Result Value Ref Range    POC Blood, Urine Negative Negative    POC Bilirubin, Urine Negative Negative    POC Urobilinogen, Urine 0.2 0.1 - 1.1    POC Ketones, Urine Negative Negative    POC Protein, Urine Negative Negative    POC Nitrates, Urine Negative Negative    POC Glucose, Urine Negative Negative    pH, UA 7.0     POC Specific Gravity, Urine 1.020 1.003 - 1.029    POC Leukocytes, Urine Negative Negative     Chaperone Present  ASSESSMENT/PLAN:  1. Encounter for supervision of normal pregnancy in third trimester, unspecified   -     POCT Urinalysis, Dipstick, Automated, W/O Scope  -     Strep Group B by PCR; Future; Expected date: 2023    2. 35 weeks gestation of pregnancy    3. Incompetent cervix in pregnancy, antepartum, third trimester  -     Cancel: US OB/GYN Procedure (Viewpoint) - Extended List - Future; Future  -     US OB/GYN Procedure (Viewpoint) - Extended List - Future  PLAN TO REMOVE CERCLAGE Monday  GBS CULTURE TODAY    4. Obesity complicating pregnancy in third  trimester    5. Low weight gain during pregnancy, antepartum      Labor unit, Kick Counts, and Pre-eclampsia given  Chaperone Present  Follow Up:  Follow up in about 1 week (around 5/4/2023) for OB FOLLOW UP.

## 2023-04-28 LAB — STREP B PCR (OHS): NOT DETECTED

## 2023-05-01 ENCOUNTER — ANESTHESIA EVENT (OUTPATIENT)
Dept: OBSTETRICS AND GYNECOLOGY | Facility: HOSPITAL | Age: 23
End: 2023-05-01
Payer: MEDICAID

## 2023-05-01 ENCOUNTER — ANESTHESIA (OUTPATIENT)
Dept: OBSTETRICS AND GYNECOLOGY | Facility: HOSPITAL | Age: 23
End: 2023-05-01
Payer: MEDICAID

## 2023-05-01 ENCOUNTER — HOSPITAL ENCOUNTER (INPATIENT)
Facility: HOSPITAL | Age: 23
LOS: 2 days | Discharge: HOME | End: 2023-05-03
Attending: OBSTETRICS & GYNECOLOGY | Admitting: OBSTETRICS & GYNECOLOGY
Payer: MEDICAID

## 2023-05-01 DIAGNOSIS — Z98.891 STATUS POST CESAREAN SECTION: Primary | ICD-10-CM

## 2023-05-01 DIAGNOSIS — Z34.92 ENCOUNTER FOR SUPERVISION OF NORMAL PREGNANCY IN SECOND TRIMESTER: ICD-10-CM

## 2023-05-01 DIAGNOSIS — Z36.89 ENCOUNTER FOR TRIAGE IN PREGNANT PATIENT: ICD-10-CM

## 2023-05-01 DIAGNOSIS — O42.90 PREMATURE RUPTURE OF MEMBRANES: ICD-10-CM

## 2023-05-01 PROBLEM — O34.33 CERVICAL CERCLAGE SUTURE PRESENT IN THIRD TRIMESTER: Status: ACTIVE | Noted: 2023-05-01

## 2023-05-01 LAB
ABO AND RH: NORMAL
ABORH RETYPE: NORMAL
ALBUMIN SERPL-MCNC: 3.2 G/DL (ref 3.4–5)
ALBUMIN/GLOB SERPL: 1.3 RATIO
ALP SERPL-CCNC: 119 UNIT/L (ref 50–144)
ALT SERPL-CCNC: 15 UNIT/L (ref 1–45)
ANION GAP SERPL CALC-SCNC: 8 MEQ/L (ref 2–13)
ANTIBODY SCREEN: NORMAL
APPEARANCE UR: CLEAR
AST SERPL-CCNC: 23 UNIT/L (ref 14–36)
BASOPHILS # BLD AUTO: 0.02 X10(3)/MCL (ref 0.01–0.08)
BASOPHILS NFR BLD AUTO: 0.2 % (ref 0.1–1.2)
BILIRUB UR QL STRIP.AUTO: NEGATIVE MG/DL
BILIRUBIN DIRECT+TOT PNL SERPL-MCNC: 0.2 MG/DL (ref 0–1)
BUN SERPL-MCNC: 4 MG/DL (ref 7–20)
CALCIUM SERPL-MCNC: 9 MG/DL (ref 8.4–10.2)
CHLORIDE SERPL-SCNC: 110 MMOL/L (ref 98–110)
CO2 SERPL-SCNC: 21 MMOL/L (ref 21–32)
COLOR UR AUTO: YELLOW
CREAT SERPL-MCNC: 0.45 MG/DL (ref 0.66–1.25)
CREAT/UREA NIT SERPL: 9 (ref 12–20)
EOSINOPHIL # BLD AUTO: 0.09 X10(3)/MCL (ref 0.04–0.36)
EOSINOPHIL NFR BLD AUTO: 1 % (ref 0.7–7)
ERYTHROCYTE [DISTWIDTH] IN BLOOD BY AUTOMATED COUNT: 19.2 % (ref 11–14.5)
GFR SERPLBLD CREATININE-BSD FMLA CKD-EPI: >90 MLS/MIN/1.73/M2
GLOBULIN SER-MCNC: 2.5 GM/DL (ref 2–3.9)
GLUCOSE SERPL-MCNC: 82 MG/DL (ref 70–115)
GLUCOSE UR QL STRIP.AUTO: NEGATIVE MG/DL
HBV SURFACE AG SERPL QL IA: NEGATIVE
HBV SURFACE AG SERPL QL IA: NORMAL
HCT VFR BLD AUTO: 37 % (ref 36–48)
HGB BLD-MCNC: 12.1 G/DL (ref 11.8–16)
HIV 1+2 AB+HIV1 P24 AG SERPL QL IA: NONREACTIVE
IMM GRANULOCYTES # BLD AUTO: 0.04 X10(3)/MCL (ref 0–0.03)
IMM GRANULOCYTES NFR BLD AUTO: 0.4 % (ref 0–0.5)
KETONES UR QL STRIP.AUTO: 15 MG/DL
LEUKOCYTE ESTERASE UR QL STRIP.AUTO: NEGATIVE UNIT/L
LYMPHOCYTES # BLD AUTO: 2.05 X10(3)/MCL (ref 1.16–3.74)
LYMPHOCYTES NFR BLD AUTO: 22.2 % (ref 20–55)
MCH RBC QN AUTO: 26.9 PG (ref 27–34)
MCV RBC AUTO: 82.4 FL (ref 79–99)
MEAN CELL HEMOGLOBIN CONCENTRATION (OHS) G/DL: 32.7 G/DL (ref 31–37)
MONOCYTES # BLD AUTO: 0.87 X10(3)/MCL (ref 0.24–0.36)
MONOCYTES NFR BLD AUTO: 9.4 % (ref 4.7–12.5)
NEUTROPHILS # BLD AUTO: 6.18 X10(3)/MCL (ref 1.56–6.13)
NEUTROPHILS NFR BLD AUTO: 66.8 % (ref 37–73)
NITRITE UR QL STRIP.AUTO: NEGATIVE
NRBC BLD AUTO-RTO: 0 % (ref 0–1)
PH UR STRIP.AUTO: 6.5 [PH]
PLATELET # BLD AUTO: 170 X10(3)/MCL (ref 140–371)
PMV BLD AUTO: 9.3 FL (ref 9.4–12.4)
POTASSIUM SERPL-SCNC: 3.3 MMOL/L (ref 3.5–5.1)
PROT SERPL-MCNC: 5.7 GM/DL (ref 6.3–8.2)
PROT UR QL STRIP.AUTO: ABNORMAL MG/DL
RBC # BLD AUTO: 4.49 X10(6)/MCL (ref 4–5.1)
RBC UR QL AUTO: NEGATIVE UNIT/L
SODIUM SERPL-SCNC: 139 MMOL/L (ref 135–145)
SP GR UR STRIP.AUTO: 1.02
SPECIMEN OUTDATE: NORMAL
T PALLIDUM AB SER QL: NONREACTIVE
UROBILINOGEN UR STRIP-ACNC: 0.2 MG/DL
WBC # SPEC AUTO: 9.3 X10(3)/MCL (ref 4–11.5)

## 2023-05-01 PROCEDURE — 59514 PRA REAN DELIVERY ONLY: ICD-10-PCS | Mod: QZ,,, | Performed by: NURSE ANESTHETIST, CERTIFIED REGISTERED

## 2023-05-01 PROCEDURE — 59514 CESAREAN DELIVERY ONLY: CPT | Mod: QZ,,, | Performed by: NURSE ANESTHETIST, CERTIFIED REGISTERED

## 2023-05-01 PROCEDURE — 85025 COMPLETE CBC W/AUTO DIFF WBC: CPT | Performed by: OBSTETRICS & GYNECOLOGY

## 2023-05-01 PROCEDURE — 62326 NJX INTERLAMINAR LMBR/SAC: CPT | Performed by: NURSE ANESTHETIST, CERTIFIED REGISTERED

## 2023-05-01 PROCEDURE — 63600175 PHARM REV CODE 636 W HCPCS: Performed by: NURSE ANESTHETIST, CERTIFIED REGISTERED

## 2023-05-01 PROCEDURE — 87389 HIV-1 AG W/HIV-1&-2 AB AG IA: CPT | Performed by: OBSTETRICS & GYNECOLOGY

## 2023-05-01 PROCEDURE — 01968 PR INSERT CATH,ART,PERCUT,SHORTTERM: ICD-10-PCS | Mod: QZ,,, | Performed by: NURSE ANESTHETIST, CERTIFIED REGISTERED

## 2023-05-01 PROCEDURE — 80053 COMPREHEN METABOLIC PANEL: CPT | Performed by: OBSTETRICS & GYNECOLOGY

## 2023-05-01 PROCEDURE — 37000008 HC ANESTHESIA 1ST 15 MINUTES: Performed by: OBSTETRICS & GYNECOLOGY

## 2023-05-01 PROCEDURE — 25000003 PHARM REV CODE 250: Performed by: NURSE ANESTHETIST, CERTIFIED REGISTERED

## 2023-05-01 PROCEDURE — 81003 URINALYSIS AUTO W/O SCOPE: CPT | Performed by: OBSTETRICS & GYNECOLOGY

## 2023-05-01 PROCEDURE — 27201423 OPTIME MED/SURG SUP & DEVICES STERILE SUPPLY: Performed by: OBSTETRICS & GYNECOLOGY

## 2023-05-01 PROCEDURE — 99238 PR HOSPITAL DISCHARGE DAY,<30 MIN: ICD-10-PCS | Mod: ,,,

## 2023-05-01 PROCEDURE — 63600175 PHARM REV CODE 636 W HCPCS: Performed by: OBSTETRICS & GYNECOLOGY

## 2023-05-01 PROCEDURE — 87340 HEPATITIS B SURFACE AG IA: CPT | Performed by: OBSTETRICS & GYNECOLOGY

## 2023-05-01 PROCEDURE — 01968 ANES/ANALG CS DLVR NEURAXIAL: CPT | Mod: QZ,,, | Performed by: NURSE ANESTHETIST, CERTIFIED REGISTERED

## 2023-05-01 PROCEDURE — 71000033 HC RECOVERY, INTIAL HOUR: Performed by: OBSTETRICS & GYNECOLOGY

## 2023-05-01 PROCEDURE — 86900 BLOOD TYPING SEROLOGIC ABO: CPT | Performed by: OBSTETRICS & GYNECOLOGY

## 2023-05-01 PROCEDURE — 72100003 HC LABOR CARE, EA. ADDL. 8 HRS

## 2023-05-01 PROCEDURE — 36000709 HC OR TIME LEV III EA ADD 15 MIN: Performed by: OBSTETRICS & GYNECOLOGY

## 2023-05-01 PROCEDURE — 72100002 HC LABOR CARE, 1ST 8 HOURS

## 2023-05-01 PROCEDURE — 86780 TREPONEMA PALLIDUM: CPT | Performed by: OBSTETRICS & GYNECOLOGY

## 2023-05-01 PROCEDURE — 99238 HOSP IP/OBS DSCHRG MGMT 30/<: CPT | Mod: ,,,

## 2023-05-01 PROCEDURE — 37000009 HC ANESTHESIA EA ADD 15 MINS: Performed by: OBSTETRICS & GYNECOLOGY

## 2023-05-01 PROCEDURE — 59514 CESAREAN DELIVERY ONLY: CPT | Mod: AT,,, | Performed by: OBSTETRICS & GYNECOLOGY

## 2023-05-01 PROCEDURE — 36000708 HC OR TIME LEV III 1ST 15 MIN: Performed by: OBSTETRICS & GYNECOLOGY

## 2023-05-01 PROCEDURE — 59514 PR CESAREAN DELIVERY ONLY: ICD-10-PCS | Mod: AT,,, | Performed by: OBSTETRICS & GYNECOLOGY

## 2023-05-01 PROCEDURE — 36415 COLL VENOUS BLD VENIPUNCTURE: CPT | Performed by: OBSTETRICS & GYNECOLOGY

## 2023-05-01 PROCEDURE — 36000704 HC OR TIME LEV I 1ST 15 MIN: Performed by: OBSTETRICS & GYNECOLOGY

## 2023-05-01 PROCEDURE — 25000003 PHARM REV CODE 250: Performed by: OBSTETRICS & GYNECOLOGY

## 2023-05-01 PROCEDURE — 11000001 HC ACUTE MED/SURG PRIVATE ROOM

## 2023-05-01 RX ORDER — LIDOCAINE HYDROCHLORIDE 10 MG/ML
10 INJECTION INFILTRATION; PERINEURAL ONCE AS NEEDED
Status: DISCONTINUED | OUTPATIENT
Start: 2023-05-01 | End: 2023-05-02

## 2023-05-01 RX ORDER — MISOPROSTOL 100 UG/1
800 TABLET ORAL ONCE AS NEEDED
Status: DISCONTINUED | OUTPATIENT
Start: 2023-05-01 | End: 2023-05-02

## 2023-05-01 RX ORDER — SODIUM CHLORIDE, SODIUM LACTATE, POTASSIUM CHLORIDE, CALCIUM CHLORIDE 600; 310; 30; 20 MG/100ML; MG/100ML; MG/100ML; MG/100ML
INJECTION, SOLUTION INTRAVENOUS CONTINUOUS
Status: DISCONTINUED | OUTPATIENT
Start: 2023-05-01 | End: 2023-05-02

## 2023-05-01 RX ORDER — METHYLERGONOVINE MALEATE 0.2 MG/ML
200 INJECTION INTRAVENOUS ONCE
Status: COMPLETED | OUTPATIENT
Start: 2023-05-01 | End: 2023-05-01

## 2023-05-01 RX ORDER — OXYTOCIN/RINGER'S LACTATE 30/500 ML
334 PLASTIC BAG, INJECTION (ML) INTRAVENOUS ONCE
Status: DISCONTINUED | OUTPATIENT
Start: 2023-05-01 | End: 2023-05-01

## 2023-05-01 RX ORDER — ROPIVACAINE HYDROCHLORIDE 2 MG/ML
INJECTION, SOLUTION EPIDURAL; INFILTRATION CONTINUOUS PRN
Status: DISCONTINUED | OUTPATIENT
Start: 2023-05-01 | End: 2023-05-01

## 2023-05-01 RX ORDER — DIPHENHYDRAMINE HCL 25 MG
25 CAPSULE ORAL EVERY 4 HOURS PRN
Status: DISCONTINUED | OUTPATIENT
Start: 2023-05-01 | End: 2023-05-03 | Stop reason: HOSPADM

## 2023-05-01 RX ORDER — OXYTOCIN/RINGER'S LACTATE 30/500 ML
95 PLASTIC BAG, INJECTION (ML) INTRAVENOUS ONCE
Status: DISCONTINUED | OUTPATIENT
Start: 2023-05-01 | End: 2023-05-02

## 2023-05-01 RX ORDER — PROCHLORPERAZINE EDISYLATE 5 MG/ML
5 INJECTION INTRAMUSCULAR; INTRAVENOUS EVERY 6 HOURS PRN
Status: DISCONTINUED | OUTPATIENT
Start: 2023-05-01 | End: 2023-05-02

## 2023-05-01 RX ORDER — SIMETHICONE 80 MG
1 TABLET,CHEWABLE ORAL 4 TIMES DAILY PRN
Status: DISCONTINUED | OUTPATIENT
Start: 2023-05-01 | End: 2023-05-02

## 2023-05-01 RX ORDER — HYDROXYZINE PAMOATE 50 MG/1
50 CAPSULE ORAL EVERY 8 HOURS PRN
Status: DISCONTINUED | OUTPATIENT
Start: 2023-05-01 | End: 2023-05-03 | Stop reason: HOSPADM

## 2023-05-01 RX ORDER — MIDAZOLAM HYDROCHLORIDE 1 MG/ML
INJECTION INTRAMUSCULAR; INTRAVENOUS
Status: DISCONTINUED | OUTPATIENT
Start: 2023-05-01 | End: 2023-05-01

## 2023-05-01 RX ORDER — TRANEXAMIC ACID 10 MG/ML
1000 INJECTION, SOLUTION INTRAVENOUS ONCE AS NEEDED
Status: DISCONTINUED | OUTPATIENT
Start: 2023-05-01 | End: 2023-05-02

## 2023-05-01 RX ORDER — OXYTOCIN/RINGER'S LACTATE 30/500 ML
95 PLASTIC BAG, INJECTION (ML) INTRAVENOUS ONCE AS NEEDED
Status: DISCONTINUED | OUTPATIENT
Start: 2023-05-01 | End: 2023-05-02

## 2023-05-01 RX ORDER — METHYLERGONOVINE MALEATE 0.2 MG/ML
200 INJECTION INTRAVENOUS
Status: DISCONTINUED | OUTPATIENT
Start: 2023-05-01 | End: 2023-05-02

## 2023-05-01 RX ORDER — OXYTOCIN 10 [USP'U]/ML
10 INJECTION, SOLUTION INTRAMUSCULAR; INTRAVENOUS ONCE AS NEEDED
Status: DISCONTINUED | OUTPATIENT
Start: 2023-05-01 | End: 2023-05-02

## 2023-05-01 RX ORDER — CALCIUM CARBONATE 200(500)MG
500 TABLET,CHEWABLE ORAL 3 TIMES DAILY PRN
Status: DISCONTINUED | OUTPATIENT
Start: 2023-05-01 | End: 2023-05-02

## 2023-05-01 RX ORDER — DOCUSATE SODIUM 100 MG/1
200 CAPSULE, LIQUID FILLED ORAL 2 TIMES DAILY
Status: DISCONTINUED | OUTPATIENT
Start: 2023-05-01 | End: 2023-05-03 | Stop reason: HOSPADM

## 2023-05-01 RX ORDER — ONDANSETRON 4 MG/1
8 TABLET, ORALLY DISINTEGRATING ORAL EVERY 8 HOURS PRN
Status: DISCONTINUED | OUTPATIENT
Start: 2023-05-01 | End: 2023-05-02

## 2023-05-01 RX ORDER — CARBOPROST TROMETHAMINE 250 UG/ML
250 INJECTION, SOLUTION INTRAMUSCULAR
Status: DISCONTINUED | OUTPATIENT
Start: 2023-05-01 | End: 2023-05-02

## 2023-05-01 RX ORDER — BUPIVACAINE HYDROCHLORIDE 2.5 MG/ML
INJECTION, SOLUTION INFILTRATION; PERINEURAL
Status: DISCONTINUED | OUTPATIENT
Start: 2023-05-01 | End: 2023-05-01

## 2023-05-01 RX ORDER — SODIUM CHLORIDE 0.9 % (FLUSH) 0.9 %
10 SYRINGE (ML) INJECTION
Status: DISCONTINUED | OUTPATIENT
Start: 2023-05-01 | End: 2023-05-03 | Stop reason: HOSPADM

## 2023-05-01 RX ORDER — OXYTOCIN/RINGER'S LACTATE 30/500 ML
95 PLASTIC BAG, INJECTION (ML) INTRAVENOUS ONCE
Status: DISCONTINUED | OUTPATIENT
Start: 2023-05-01 | End: 2023-05-03 | Stop reason: HOSPADM

## 2023-05-01 RX ORDER — ACETAMINOPHEN 500 MG
500 TABLET ORAL EVERY 6 HOURS PRN
Status: DISCONTINUED | OUTPATIENT
Start: 2023-05-01 | End: 2023-05-02

## 2023-05-01 RX ORDER — AMOXICILLIN 250 MG
1 CAPSULE ORAL NIGHTLY PRN
Status: DISCONTINUED | OUTPATIENT
Start: 2023-05-01 | End: 2023-05-03 | Stop reason: HOSPADM

## 2023-05-01 RX ORDER — LIDOCAINE HCL/EPINEPHRINE/PF 2%-1:200K
VIAL (ML) INJECTION
Status: COMPLETED
Start: 2023-05-01 | End: 2023-05-01

## 2023-05-01 RX ORDER — PHENYLEPHRINE HYDROCHLORIDE 10 MG/ML
INJECTION INTRAVENOUS
Status: DISCONTINUED | OUTPATIENT
Start: 2023-05-01 | End: 2023-05-01

## 2023-05-01 RX ORDER — OXYCODONE AND ACETAMINOPHEN 10; 325 MG/1; MG/1
1 TABLET ORAL EVERY 4 HOURS PRN
Status: DISCONTINUED | OUTPATIENT
Start: 2023-05-01 | End: 2023-05-03 | Stop reason: HOSPADM

## 2023-05-01 RX ORDER — OXYTOCIN/RINGER'S LACTATE 30/500 ML
334 PLASTIC BAG, INJECTION (ML) INTRAVENOUS ONCE AS NEEDED
Status: DISCONTINUED | OUTPATIENT
Start: 2023-05-01 | End: 2023-05-02

## 2023-05-01 RX ORDER — SIMETHICONE 80 MG
1 TABLET,CHEWABLE ORAL EVERY 6 HOURS PRN
Status: DISCONTINUED | OUTPATIENT
Start: 2023-05-01 | End: 2023-05-03 | Stop reason: HOSPADM

## 2023-05-01 RX ORDER — IBUPROFEN 600 MG/1
600 TABLET ORAL EVERY 6 HOURS
Status: DISCONTINUED | OUTPATIENT
Start: 2023-05-02 | End: 2023-05-03 | Stop reason: HOSPADM

## 2023-05-01 RX ORDER — OXYTOCIN/RINGER'S LACTATE 30/500 ML
0-30 PLASTIC BAG, INJECTION (ML) INTRAVENOUS CONTINUOUS
Status: DISCONTINUED | OUTPATIENT
Start: 2023-05-01 | End: 2023-05-02

## 2023-05-01 RX ORDER — SODIUM CHLORIDE 9 MG/ML
INJECTION, SOLUTION INTRAVENOUS
Status: DISCONTINUED | OUTPATIENT
Start: 2023-05-01 | End: 2023-05-02

## 2023-05-01 RX ORDER — HYDROCODONE BITARTRATE AND ACETAMINOPHEN 7.5; 325 MG/1; MG/1
1 TABLET ORAL EVERY 4 HOURS PRN
Status: DISCONTINUED | OUTPATIENT
Start: 2023-05-01 | End: 2023-05-03 | Stop reason: HOSPADM

## 2023-05-01 RX ORDER — OXYTOCIN/RINGER'S LACTATE 30/500 ML
95 PLASTIC BAG, INJECTION (ML) INTRAVENOUS ONCE AS NEEDED
Status: COMPLETED | OUTPATIENT
Start: 2023-05-01 | End: 2023-05-01

## 2023-05-01 RX ORDER — LIDOCAINE HYDROCHLORIDE 20 MG/ML
INJECTION, SOLUTION EPIDURAL; INFILTRATION; INTRACAUDAL; PERINEURAL
Status: COMPLETED | OUTPATIENT
Start: 2023-05-01 | End: 2023-05-01

## 2023-05-01 RX ORDER — FENTANYL CITRATE 50 UG/ML
INJECTION, SOLUTION INTRAMUSCULAR; INTRAVENOUS
Status: DISCONTINUED | OUTPATIENT
Start: 2023-05-01 | End: 2023-05-01

## 2023-05-01 RX ORDER — MUPIROCIN 20 MG/G
OINTMENT TOPICAL
Status: CANCELLED | OUTPATIENT
Start: 2023-05-01

## 2023-05-01 RX ORDER — TRANEXAMIC ACID 10 MG/ML
1000 INJECTION, SOLUTION INTRAVENOUS ONCE AS NEEDED
Status: DISCONTINUED | OUTPATIENT
Start: 2023-05-01 | End: 2023-05-03 | Stop reason: HOSPADM

## 2023-05-01 RX ORDER — ADHESIVE BANDAGE
30 BANDAGE TOPICAL 2 TIMES DAILY PRN
Status: DISCONTINUED | OUTPATIENT
Start: 2023-05-02 | End: 2023-05-03 | Stop reason: HOSPADM

## 2023-05-01 RX ORDER — ONDANSETRON 4 MG/1
8 TABLET, ORALLY DISINTEGRATING ORAL EVERY 8 HOURS PRN
Status: DISCONTINUED | OUTPATIENT
Start: 2023-05-01 | End: 2023-05-03 | Stop reason: HOSPADM

## 2023-05-01 RX ORDER — BISACODYL 10 MG
10 SUPPOSITORY, RECTAL RECTAL ONCE AS NEEDED
Status: DISCONTINUED | OUTPATIENT
Start: 2023-05-01 | End: 2023-05-03 | Stop reason: HOSPADM

## 2023-05-01 RX ORDER — ACETAMINOPHEN 10 MG/ML
1000 INJECTION, SOLUTION INTRAVENOUS ONCE
Status: COMPLETED | OUTPATIENT
Start: 2023-05-01 | End: 2023-05-01

## 2023-05-01 RX ORDER — ROPIVACAINE HYDROCHLORIDE 2 MG/ML
INJECTION, SOLUTION EPIDURAL; INFILTRATION
Status: COMPLETED
Start: 2023-05-01 | End: 2023-05-01

## 2023-05-01 RX ORDER — LIDOCAINE HCL/EPINEPHRINE/PF 2%-1:200K
VIAL (ML) INJECTION
Status: DISCONTINUED | OUTPATIENT
Start: 2023-05-01 | End: 2023-05-01

## 2023-05-01 RX ORDER — PRENATAL WITH FERROUS FUM AND FOLIC ACID 3080; 920; 120; 400; 22; 1.84; 3; 20; 10; 1; 12; 200; 27; 25; 2 [IU]/1; [IU]/1; MG/1; [IU]/1; MG/1; MG/1; MG/1; MG/1; MG/1; MG/1; UG/1; MG/1; MG/1; MG/1; MG/1
1 TABLET ORAL DAILY
Status: DISCONTINUED | OUTPATIENT
Start: 2023-05-02 | End: 2023-05-03 | Stop reason: HOSPADM

## 2023-05-01 RX ORDER — BUPIVACAINE HYDROCHLORIDE 2.5 MG/ML
INJECTION, SOLUTION EPIDURAL; INFILTRATION; INTRACAUDAL
Status: DISPENSED
Start: 2023-05-01 | End: 2023-05-02

## 2023-05-01 RX ADMIN — FENTANYL CITRATE 50 MCG: 50 INJECTION, SOLUTION INTRAMUSCULAR; INTRAVENOUS at 09:05

## 2023-05-01 RX ADMIN — LIDOCAINE HYDROCHLORIDE AND EPINEPHRINE 3 ML: 20; 5 INJECTION, SOLUTION EPIDURAL; INFILTRATION; INTRACAUDAL; PERINEURAL at 06:05

## 2023-05-01 RX ADMIN — MIDAZOLAM HYDROCHLORIDE 1 MG: 1 INJECTION, SOLUTION INTRAMUSCULAR; INTRAVENOUS at 09:05

## 2023-05-01 RX ADMIN — HYDROXYZINE PAMOATE 50 MG: 50 CAPSULE ORAL at 06:05

## 2023-05-01 RX ADMIN — ACETAMINOPHEN 1000 MG: 1000 INJECTION, SOLUTION INTRAVENOUS at 11:05

## 2023-05-01 RX ADMIN — LIDOCAINE HYDROCHLORIDE 5 ML: 20 INJECTION, SOLUTION EPIDURAL; INFILTRATION; INTRACAUDAL; PERINEURAL at 11:05

## 2023-05-01 RX ADMIN — Medication 300 ML/HR: at 09:05

## 2023-05-01 RX ADMIN — BUPIVACAINE HYDROCHLORIDE 3 ML: 2.5 INJECTION, SOLUTION INFILTRATION; PERINEURAL at 08:05

## 2023-05-01 RX ADMIN — ACETAMINOPHEN 500 MG: 500 TABLET, FILM COATED ORAL at 06:05

## 2023-05-01 RX ADMIN — LIDOCAINE HYDROCHLORIDE 3 ML: 20 INJECTION, SOLUTION EPIDURAL; INFILTRATION; INTRACAUDAL; PERINEURAL at 11:05

## 2023-05-01 RX ADMIN — LIDOCAINE HYDROCHLORIDE AND EPINEPHRINE 3 ML: 20; 5 INJECTION, SOLUTION EPIDURAL; INFILTRATION; INTRACAUDAL; PERINEURAL at 08:05

## 2023-05-01 RX ADMIN — SODIUM CHLORIDE, POTASSIUM CHLORIDE, SODIUM LACTATE AND CALCIUM CHLORIDE: 600; 310; 30; 20 INJECTION, SOLUTION INTRAVENOUS at 08:05

## 2023-05-01 RX ADMIN — Medication 4 MILLI-UNITS/MIN: at 02:05

## 2023-05-01 RX ADMIN — PHENYLEPHRINE HYDROCHLORIDE 200 MCG: 10 INJECTION, SOLUTION INTRAVENOUS at 09:05

## 2023-05-01 RX ADMIN — ROPIVACAINE HYDROCHLORIDE 11 ML/HR: 2 INJECTION, SOLUTION EPIDURAL; INFILTRATION at 12:05

## 2023-05-01 RX ADMIN — METHYLERGONOVINE MALEATE 200 MCG: 0.2 INJECTION, SOLUTION INTRAMUSCULAR; INTRAVENOUS at 09:05

## 2023-05-01 RX ADMIN — SODIUM CHLORIDE, POTASSIUM CHLORIDE, SODIUM LACTATE AND CALCIUM CHLORIDE 1000 ML: 600; 310; 30; 20 INJECTION, SOLUTION INTRAVENOUS at 09:05

## 2023-05-01 NOTE — BRIEF OP NOTE
[facility]-Labor & Delivery  Brief Operative Note    SUMMARY     Surgery Date: 2023     Surgeon(s) and Role:     * Keith Kang MD - Primary  Anesthetist: Mayco Hancock CRNA  Assistant: Dyan Vera CST.  Circulator: Kimberly Sierra RN.    Assisting Surgeon: None    Pre-op Diagnosis:      *Cervical cerclage suture present, third trimester [O34.33]     * Incompetent cervix in pregnancy, antepartum, third trimester [O34.33]      * 36 weeks gestation of pregnancy [Z3A.36]      * Premature rupture of membranes [O42.90]    Post-op Diagnosis:  Post-Op Diagnosis Codes:        * Cervical cerclage suture present, third trimester [O34.33]      * Incompetent cervix in pregnancy, antepartum, third trimester [O34.33]      * 36 weeks gestation of pregnancy [Z3A.36]     *  Premature rupture of membranes [O42.90]    Procedure(s) (LRB):   SECTION (N/A)    Anesthesia: Spinal/Epidural    DRAINS: FERNANDEZ TO GRAVITY    Operative Findings: The patient was taken to the OR after verbal consent was given to remove her cerclage. She had an epidural in place. Two attempts were made to remove the cerclage on the labor unit, but these attempts were unsuccesful secondary to the patient's very anterior cervix.  She needed better positioning and additional retraction that the OR could provide.     The patient was placed in lithotomy on the OR table, her bladder had been drained via indwelling fernandez catheter. She was placed in Trendelenburg. Using a large Graves speculum, the anterior lip of there cervix was visualized. The cerclage was knotted at the 12 o'clock position. Grasping the cerclage with the ring forceps, the knot was lifted above the cervix and the suture was cut with long Ricardo's and removed intact.  The cervix was 4cm, 50% effaced, and minus 3 station, vertex. The patient tolerated the procedure well, was taken down from lithotomy, and taken back to the labor unit.     Estimated Blood Loss: * No values  recorded between 5/1/2023  1:50 PM and 5/1/2023  2:15 PM *  NONE          Specimens: NONE  Specimen (24h ago, onward)      None            [id number]

## 2023-05-01 NOTE — PLAN OF CARE
PATIENT STABLE AND RESTING COMFORTABLY. PATIENT STATES NO PAIN. PATIENT MEETS CRITERIA FOR TRANSFER.

## 2023-05-01 NOTE — INTERVAL H&P NOTE
The patient has been examined and the H&P has been reviewed:    I concur with the findings and changes have been noted since the H&P was written: the patient is a 21yo  at 36w1d with history of cervical incompetence with cerclage in place who presents to labor unit this morning complaining of leaking fluid since 11pm last evening. Cervical exam is now 4 cm with cerclage in place.      Anesthesia/Surgery risks, benefits and alternative options discussed and understood by patient/family.    There are no hospital problems to display for this patient.

## 2023-05-01 NOTE — ANESTHESIA PREPROCEDURE EVALUATION
05/01/2023  Chiquita Montelongo is a 22 y.o., female.      Pre-op Assessment    I have reviewed the Patient Summary Reports.     I have reviewed the Nursing Notes. I have reviewed the NPO Status.   I have reviewed the Medications.     Review of Systems  Anesthesia Hx:  No problems with previous Anesthesia  Denies Family Hx of Anesthesia complications.   Denies Personal Hx of Anesthesia complications.   Hematology/Oncology:  Hematology Normal   Oncology Normal     EENT/Dental:EENT/Dental Normal   Cardiovascular:  Cardiovascular Normal Exercise tolerance: good     Pulmonary:  Pulmonary Normal  Asthma:    Renal/:  Renal/ Normal     Hepatic/GI:  Hepatic/GI Normal    Musculoskeletal:  Musculoskeletal Normal    OB/GYN/PEDS:  Planned Vaginal Delivery    Neurological:  Neurology Normal    Endocrine:  Endocrine Normal    Dermatological:  Skin Normal    Psych:   Psychiatric History anxiety depression          Physical Exam  General: Cooperative    Airway:  Mallampati: III / II  Mouth Opening: Normal  TM Distance: Normal  Tongue: Normal  Neck ROM: Normal ROM    Dental:  Intact        Anesthesia Plan  Type of Anesthesia, risks & benefits discussed:    Anesthesia Type: Epidural  Intra-op Monitoring Plan: Standard ASA Monitors  Post Op Pain Control Plan: epidural analgesia  Informed Consent: Informed consent signed with the Patient and all parties understand the risks and agree with anesthesia plan.  All questions answered. Patient consented to blood products? Yes  ASA Score: 2  Day of Surgery Review of History & Physical: H&P Update referred to the surgeon/provider.I have interviewed and examined the patient. I have reviewed the patient's H&P dated: There are no significant changes.     Ready For Surgery From Anesthesia Perspective.     .

## 2023-05-01 NOTE — ANESTHESIA PROCEDURE NOTES
Epidural    Patient location during procedure: OB   Reason for block: primary anesthetic   Reason for block: labor analgesia requested by patient and obstetrician  Diagnosis: TERM PREGNANCY WITH CERCLAGE   Start time: 5/1/2023 11:30 AM  Timeout: 5/1/2023 11:30 AM  End time: 5/1/2023 11:40 AM  Surgery related to: VAGINAL DELIVERY    Staffing  Performing Provider: Adán Montelongo CRNA  Authorizing Provider: Adán Montelongo CRNA        Preanesthetic Checklist  Completed: patient identified, IV checked, site marked, risks and benefits discussed, surgical consent, monitors and equipment checked, pre-op evaluation, timeout performed, anesthesia consent given, hand hygiene performed and patient being monitored  Preparation  Patient position: sitting  Prep: Betadine  Patient monitoring: Pulse Ox and Blood Pressure  Reason for block: primary anesthetic   Epidural  Skin Wheal: 5 mL  Administration type: continuous  Approach: midline  Interspace: L3-4    Injection technique: CORDELIA saline  Block type: caudal.  Needle and Epidural Catheter  Needle type: Tuohy   Needle gauge: 18  Needle length: 7.0 inches  Needle insertion depth: 5 cm  Catheter type: multi-orifice  Catheter size: 20 G  Insertion Attempts: 1  Test dose: 3 mL of lidocaine 1.5% with Epi 1-to-200,000  Additional Documentation: incremental injection, no signs/symptoms of IV or SA injection and negative aspiration for heme and CSF  Needle localization: anatomical landmarks  Assessment  Upper dermatomal levels - Left: T8  Right: T8   Dermatomal levels determined by pinch or prick  Ease of block: easy  Patient's tolerance of the procedure: comfortable throughout block No inadvertent dural puncture with Tuohy.  Dural puncture not performed with spinal needle    Medications:    Medications: lidocaine (PF) 20 mg/mL (2%) injection - Epidural, Left Back   5 mL - 5/1/2023 11:33:00 AM   3 mL - 5/1/2023 11:38:00 AM

## 2023-05-02 LAB
BASOPHILS # BLD AUTO: 0.02 X10(3)/MCL (ref 0.01–0.08)
BASOPHILS NFR BLD AUTO: 0.2 % (ref 0.1–1.2)
EOSINOPHIL # BLD AUTO: 0.02 X10(3)/MCL (ref 0.04–0.36)
EOSINOPHIL NFR BLD AUTO: 0.2 % (ref 0.7–7)
ERYTHROCYTE [DISTWIDTH] IN BLOOD BY AUTOMATED COUNT: 19.2 % (ref 11–14.5)
HCT VFR BLD AUTO: 34.7 % (ref 36–48)
HGB BLD-MCNC: 11.2 G/DL (ref 11.8–16)
IMM GRANULOCYTES # BLD AUTO: 0.04 X10(3)/MCL (ref 0–0.03)
IMM GRANULOCYTES NFR BLD AUTO: 0.4 % (ref 0–0.5)
LYMPHOCYTES # BLD AUTO: 1.19 X10(3)/MCL (ref 1.16–3.74)
LYMPHOCYTES NFR BLD AUTO: 12.4 % (ref 20–55)
MCH RBC QN AUTO: 27.1 PG (ref 27–34)
MCHC RBC AUTO-ENTMCNC: 32.3 G/DL (ref 31–37)
MCV RBC AUTO: 83.8 FL (ref 79–99)
MONOCYTES # BLD AUTO: 0.86 X10(3)/MCL (ref 0.24–0.36)
MONOCYTES NFR BLD AUTO: 9 % (ref 4.7–12.5)
NEUTROPHILS # BLD AUTO: 7.47 X10(3)/MCL (ref 1.56–6.13)
NEUTROPHILS NFR BLD AUTO: 77.8 % (ref 37–73)
NRBC BLD AUTO-RTO: 0 % (ref 0–1)
PLATELET # BLD AUTO: 146 X10(3)/MCL (ref 140–371)
PMV BLD AUTO: 8.9 FL (ref 9.4–12.4)
RBC # BLD AUTO: 4.14 X10(6)/MCL (ref 4–5.1)
WBC # SPEC AUTO: 9.6 X10(3)/MCL (ref 4–11.5)

## 2023-05-02 PROCEDURE — 25000003 PHARM REV CODE 250

## 2023-05-02 PROCEDURE — 51702 INSERT TEMP BLADDER CATH: CPT

## 2023-05-02 PROCEDURE — 36415 COLL VENOUS BLD VENIPUNCTURE: CPT | Performed by: OBSTETRICS & GYNECOLOGY

## 2023-05-02 PROCEDURE — 25000003 PHARM REV CODE 250: Performed by: OBSTETRICS & GYNECOLOGY

## 2023-05-02 PROCEDURE — 63600175 PHARM REV CODE 636 W HCPCS: Performed by: OBSTETRICS & GYNECOLOGY

## 2023-05-02 PROCEDURE — 72100002 HC LABOR CARE, 1ST 8 HOURS

## 2023-05-02 PROCEDURE — 85025 COMPLETE CBC W/AUTO DIFF WBC: CPT | Performed by: OBSTETRICS & GYNECOLOGY

## 2023-05-02 PROCEDURE — 11000001 HC ACUTE MED/SURG PRIVATE ROOM

## 2023-05-02 PROCEDURE — 72100003 HC LABOR CARE, EA. ADDL. 8 HRS

## 2023-05-02 RX ORDER — METHYLERGONOVINE MALEATE 0.2 MG/ML
200 INJECTION INTRAVENOUS
Status: DISCONTINUED | OUTPATIENT
Start: 2023-05-02 | End: 2023-05-03 | Stop reason: HOSPADM

## 2023-05-02 RX ORDER — HYDROMORPHONE HYDROCHLORIDE 1 MG/ML
1 INJECTION, SOLUTION INTRAMUSCULAR; INTRAVENOUS; SUBCUTANEOUS ONCE
Status: COMPLETED | OUTPATIENT
Start: 2023-05-02 | End: 2023-05-02

## 2023-05-02 RX ORDER — DIPHENOXYLATE HYDROCHLORIDE AND ATROPINE SULFATE 2.5; .025 MG/1; MG/1
1 TABLET ORAL 4 TIMES DAILY PRN
Status: DISCONTINUED | OUTPATIENT
Start: 2023-05-02 | End: 2023-05-03 | Stop reason: HOSPADM

## 2023-05-02 RX ORDER — MISOPROSTOL 100 UG/1
800 TABLET ORAL
Status: DISCONTINUED | OUTPATIENT
Start: 2023-05-02 | End: 2023-05-03 | Stop reason: HOSPADM

## 2023-05-02 RX ORDER — CARBOPROST TROMETHAMINE 250 UG/ML
250 INJECTION, SOLUTION INTRAMUSCULAR
Status: DISCONTINUED | OUTPATIENT
Start: 2023-05-02 | End: 2023-05-03 | Stop reason: HOSPADM

## 2023-05-02 RX ADMIN — HYDROMORPHONE HYDROCHLORIDE 1 MG: 1 INJECTION, SOLUTION INTRAMUSCULAR; INTRAVENOUS; SUBCUTANEOUS at 12:05

## 2023-05-02 RX ADMIN — IBUPROFEN 600 MG: 600 TABLET, FILM COATED ORAL at 05:05

## 2023-05-02 RX ADMIN — OXYCODONE AND ACETAMINOPHEN 1 TABLET: 10; 325 TABLET ORAL at 05:05

## 2023-05-02 RX ADMIN — DOCUSATE SODIUM 200 MG: 100 CAPSULE, LIQUID FILLED ORAL at 11:05

## 2023-05-02 RX ADMIN — HYDROCODONE BITARTRATE AND ACETAMINOPHEN 1 TABLET: 7.5; 325 TABLET ORAL at 08:05

## 2023-05-02 RX ADMIN — PRENATAL VITAMINS-IRON FUMARATE 27 MG IRON-FOLIC ACID 0.8 MG TABLET 1 TABLET: at 11:05

## 2023-05-02 RX ADMIN — OXYCODONE AND ACETAMINOPHEN 1 TABLET: 10; 325 TABLET ORAL at 11:05

## 2023-05-02 RX ADMIN — IBUPROFEN 600 MG: 600 TABLET, FILM COATED ORAL at 11:05

## 2023-05-02 RX ADMIN — DOCUSATE SODIUM 200 MG: 100 CAPSULE, LIQUID FILLED ORAL at 08:05

## 2023-05-02 NOTE — PLAN OF CARE
PATIENT STABLE AND RESTING COMFORTABLY. PATIENT STATES NO OP SITE PAIN. PATIENT MEETS CRITERIA FOR TRANSFER.

## 2023-05-02 NOTE — PROGRESS NOTES
PT STATES THAT SHE BEGAN CONTINUOUSLY LEAKING FLUID AT HOME AT 2300 ON 04/30/23. PT PRESENTED TO HOSPITAL AT APPROXIMATELY 0830 THIS MORNING AND WAS FOUND TO BE GROSSLY RUPTURED PER MAYTE POE.

## 2023-05-02 NOTE — ANESTHESIA POSTPROCEDURE EVALUATION
Anesthesia Post Evaluation    Patient: Chiquita Jayda    Procedure(s) Performed: * No procedures listed *    Final Anesthesia Type: epidural      Patient location during evaluation: PACU  Patient participation: Yes- Able to Participate  Level of consciousness: awake and alert, awake and oriented  Post-procedure vital signs: reviewed and stable  Pain management: adequate  Airway patency: patent    PONV status at discharge: No PONV  Anesthetic complications: no      Cardiovascular status: blood pressure returned to baseline  Respiratory status: unassisted, room air and spontaneous ventilation  Hydration status: euvolemic  Follow-up not needed.          Vitals Value Taken Time   /70 05/01/23 2209   Temp 36.8 °C (98.3 °F) 05/01/23 1853   Pulse 92 05/01/23 2210   Resp 20 05/01/23 0828   SpO2 99 % 05/01/23 2210   Vitals shown include unvalidated device data.      No case tracking events are documented in the log.      Pain/Cat Score: Pain Rating Prior to Med Admin: 5 (5/1/2023  6:53 PM)

## 2023-05-02 NOTE — L&D DELIVERY NOTE
[facility]Labor [facility] Delivery   Section   Operative Note    SUMMARY     Date of Procedure: 2023     Procedure: Procedure(s) (LRB):   SECTION (N/A)    Surgeon(s) and Role:     * Keith Kang MD - Primary  Assisting Surgeon: None  Anesthesia: Jono Guerin CRNA.  Circulator: Isac Carpenter RN.  Assistants: Francesco Lewis and Sweetie Santana.     Pre-Operative Diagnosis:      * Arrest of dilation, delivered, current hospitalization [O62.1]     * Obesity complicating pregnancy in third trimester [O99.213]     *  premature rupture of membranes in third trimester [O42.913]     *  labor in third trimester with  delivery [O60.14X0]     * History of cervical incompetence in pregnancy, currently pregnant, third trimester [O09.293]     * Cervical cerclage suture present in third trimester [O34.33]    Post-Operative Diagnosis:      * Arrest of dilation, delivered, current hospitalization [O62.1]     * Obesity complicating pregnancy in third trimester [O99.213]     *  premature rupture of membranes in third trimester [O42.913]     *  labor in third trimester with  delivery [O60.14X0]     * History of cervical incompetence in pregnancy, currently pregnant, third trimester [O09.293]     * Cervical cerclage suture present in third trimester [O34.33]     * Delivery of pregnancy by  section [O82]    Anesthesia: Spinal/Epidural          Significant Surgical Tasks Conducted by the Assistant(s), if Applicable: N/A    Complications: No    Blood Loss: * No values recorded between 2023 12:00 AM and 2023 10:10 PM * EBL: 700 ml    Description of the Findings of the Procedure:     The patient had been ruptured for 20 plus hours with cervical dilation arrested at 6cm for 4 hours with adequate contractions. She was not febrile and did not show any signs of chorioamnionitis. She was apprised that she would need  section to delivery the baby.  She was taken to the OR after the all the risks, benefits, and alternatives to surgery were discussed with her. Her epidural was re-dosed by the anesthetist. She was then prepped with Chloroprep with 3 minute drying time and draped by the scrub tech.    Time out taken with OR team members.  Pfannenstiel Incision made through the skin down to the underlying rectus fascia, the transverse fascial incision was developed, rectus muscles  in the midline and the peritoneum entered.   There were no adhesions noted.  The lower uterine segment and position of the fetus identified.   Bladder flap taken down through transverse peritoneal incision.    Low Transverse Incision made through well developed lower uterine segment and extended laterally with blunt dissection.   Scant clear fluid noted.  Viable male infant delivered from vertex presentation.  There was tight nuchal times two, reduced on the field. Cord clamped and  handed to attending nurse.  Cord blood taken, placenta delivered spontaneously intact.   The uterus was externalized.  The edges of the uterine incision are grasped with Goodson clamps at the angles and the inferior and superior midline edges of the incision.    Closure of the hysterotomy with running lock 1 Monocryl.    There was uterine atony that did not respond to massage and the patient was given one does of Methergine.   Observation for bleeding with suture of any bleeding along the hysterotomy line.   With good hemostasis noted, the anterior pelvis is rinsed with sterile saline.   Right and left adnexa with normal anatomy.  The uterus was returned to the abdomen.  Closure of the abdomen with 0 Vicryl running of the peritoneum , 0 Vicryl of the fascia starting at the left angle and tying at the right angle. The muscles were reapproximated with 0 Vicryl as well.   Skin closure with 3 0 Vicryl subcuticular with a straight Trey needle.  The incision was then covered with Dermabond.       "     Specimens: cord blood for blood typing  Specimen (24h ago, onward)      None            Condition: Good    Disposition: PACU - hemodynamically stable.    Attestation: Good       Delivery Information for Bart Montelongo    Birth information:  YOB: 2022   Time of birth: 9:14 PM   Sex: male   Head Delivery Date/Time: 2023  9:13 PM   Delivery type: , Low Transverse   Gestational Age: 36w1d    Delivery Providers    Delivering clinician: Keith Kang MD           Measurements    Weight: 3902 g  Weight (lbs): 8 lb 9.6 oz  Length: 52.1 cm  Length (in): 20.5"  Head circumference: 14.5 cm         Apgars    Living status: Living  Apgars:  1 min.:  5 min.:  10 min.:  15 min.:  20 min.:    Skin color:  1  1  1      Heart rate:  1  2  2      Reflex irritability:  1  1  2      Muscle tone:  1  1  1      Respiratory effort:  1  2  2      Total:  5  7  8               Operative Delivery    Forceps attempted?: No  Vacuum extractor attempted?: No         Shoulder Dystocia    Shoulder dystocia present?: No                 Interventions/Resuscitation    Method: Bulb Suctioning, Blow By Oxygen, Tactile Stimulation, Deep Suctioning, CPAP, PPV       Cord    Vessels: 3 vessels  Complications: Nuchal  Nuchal Intervention: reduced  Nuchal Cord Description: tight nuchal cord  Number of Loops: 2  Delayed Cord Clamping?: No  Cord Clamped Date/Time: 2023  9:14 PM  Cord Blood Disposition: Lab  Gases Sent?: No  Stem Cell Collection (by MD): No       Placenta    Placenta delivery date/time: 2023  Placenta removal: Spontaneous  Placenta appearance: Intact  Placenta disposition: Discarded           Labor Events:       labor: Yes     Labor Onset Date/Time: 2023 23:00     Dilation Complete Date/Time:         Start Pushing Date/Time:       Rupture Date/Time: 23  2300           Rupture type: PROM (Premature Rupture)           Fluid Amount:         Fluid Color: Clear         " steroids: None     Antibiotics given for GBS: No     Induction:       Indications for induction:        Augmentation: oxytocin     Indications for augmentation: Ineffective Contraction Pattern     Labor complications: Failure to Progress in First Stage     Additional complications: Rom (Rupture Of Membranes), Premature        Cervical ripening:                     Delivery:      Episiotomy:       Indication for Episiotomy:       Perineal Lacerations:   Repaired:      Periurethral Laceration:   Repaired:     Labial Laceration:   Repaired:     Sulcus Laceration:   Repaired:     Vaginal Laceration:   Repaired:     Cervical Laceration:   Repaired:     Repair suture:       Repair # of packets:       Last Value - EBL - Nursing (mL):       Sum - EBL - Nursing (mL): 0     Last Value - EBL - Anesthesia (mL):        Calculated QBL (mL): 360        Vaginal Sweep Performed:       Surgicount Correct:         Other providers:       Anesthesia    Method: Epidural          Details (if applicable):  Trial of Labor No    Categorization: Primary    Priority:     Indications for : Labor dystocia/arrest of active phase of labor   Incision Type: low transverse

## 2023-05-02 NOTE — PLAN OF CARE
05/02/23 1136   OB Discharge Planning Assessment   Assessment Type Discharge Planning Assessment   Source of Information patient   Verified Demographic and Insurance Information Yes   Insurance Medicaid   Medicaid [facility]   Medicaid Insurance Primary   Pastoral Care/Clergy/ Contact Status none needed   People in Home significant other   Name(s) of People in Home Han Patel   Number people in home 2   Relationship Status In relationship   Name of Support/Comfort Primary Source Han Patel   Employed No   Currently Enrolled in School No   Highest Level of Education Some College   Father's Involvement Fully Involved   Is Father signing the birth certificate Yes   Father's Address same   Father Currently Enrolled in School No   Father's Employer [employer]   Father's Job Title [employer]   Family Involvement High   Primary Contact Name and Number Han Patel [phone]   Received Prenatal Care Yes   Transportation Anticipated family or friend will provide   Receive [facility] Benefits Not interested    Arrangements Self   Adoption Planned no   Infant Feeding Plan breastfeeding   Previous Breastfeeding Experience no   Breast Pump Needed no   Does baby have crib or safe sleep space? Yes   Do you have a car seat? Yes   Has other essential care items? Clothing;Bottles;Diapers   Pediatrician    Resource/Environmental Concerns none   Equipment Currently Used at Home none   Potential Discharge Needs None   DME Needed Upon Discharge  none   DCFS No indications (Indicators for Report)   Discharge Plan A Home with family   Do you have any problems affording any of your prescribed medications? No   Physical Activity   On average, how many days per week do you engage in moderate to strenuous exercise (like a brisk walk)? 0 days   On average, how many minutes do you engage in exercise at this level? 0 min   Financial Resource Strain   How hard is it for you to  pay for the very basics like food, housing, medical care, and heating? Not hard   Housing Stability   In the last 12 months, was there a time when you were not able to pay the mortgage or rent on time? N   In the last 12 months, how many places have you lived? 1   In the last 12 months, was there a time when you did not have a steady place to sleep or slept in a shelter (including now)? N   Transportation Needs   In the past 12 months, has lack of transportation kept you from medical appointments or from getting medications? no   In the past 12 months, has lack of transportation kept you from meetings, work, or from getting things needed for daily living? No   Food Insecurity   Within the past 12 months, you worried that your food would run out before you got the money to buy more. Never true   Within the past 12 months, the food you bought just didn't last and you didn't have money to get more. Never true   Stress   Do you feel stress - tense, restless, nervous, or anxious, or unable to sleep at night because your mind is troubled all the time - these days? Not at all   Social Connections   In a typical week, how many times do you talk on the phone with family, friends, or neighbors? More than 3   How often do you get together with friends or relatives? Twice   How often do you attend Holiness or Church services? More than 4   Do you belong to any clubs or organizations such as Holiness groups, unions, fraternal or athletic groups, or school groups? No   How often do you attend meetings of the clubs or organizations you belong to? Never   Are you , , , , never , or living with a partner? Living with   Alcohol Use   Q1: How often do you have a drink containing alcohol? Never   Q2: How many drinks containing alcohol do you have on a typical day when you are drinking? None   Q3: How often do you have six or more drinks on one occasion? Never

## 2023-05-02 NOTE — PROGRESS NOTES
[facility]/Baby  Obstetrics  Postpartum Progress Note    Patient Name: Chiquita Montelongo  MRN: [id number]  Admission Date: 2023  Hospital Length of Stay: 1 days  Attending Physician: Keith Kang MD  Primary Care Provider: Primary Doctor No    Subjective:     Principal Problem: premature rupture of membranes in third trimester    Hospital course: Feeling fine today. She has been up and about and [place] is now out. She offers no complaints.     Interval History: Her baby is in the nursery due to some labored breathing due to being 36 weeks gestation premature.     She is doing well this morning. She is tolerating a regular diet without nausea or vomiting. She is voiding spontaneously. She is ambulating. She has not passed flatus, and has not a BM. Vaginal bleeding is mild. She denies fever or chills. Abdominal pain is mild and controlled with oral medications. She Is breastfeeding. She desires circumcision for her male baby: yes.     Objective:     Vital Signs (Most Recent):  Temp: 97.5 °F (36.4 °C) (23 1140)  Pulse: (!) 119 (23 0725)  Resp: 19 (23 1140)  BP: (!) 109/59 (23 0725)  SpO2: 99 % (23 0725)   Vital Signs (24h Range):  Temp:  [97.2 °F (36.2 °C)-99.7 °F (37.6 °C)] 97.5 °F (36.4 °C)  Pulse:  [] 119  Resp:  [18-20] 19  SpO2:  [99 %-100 %] 99 %  BP: ()/(55-86) 109/59     Weight: 122.2 kg (269 lb 6.4 oz)  Body mass index is 47.72 kg/m².      Intake/Output Summary (Last 24 hours) at 2023 1323  Last data filed at 2023 0545  Gross per 24 hour   Intake 250 ml   Output 1310 ml   Net -1060 ml       Physical Exam    Significant Labs:  Lab Results   Component Value Date    GROUPTRH O POS 2022    HEPBSAG Negative 2022     Recent Labs   Lab 23  0532   HGB 11.2*   HCT 34.7*       CBC:   Recent Labs   Lab 23  0532   WBC 9.60   RBC 4.14   HGB 11.2*   HCT 34.7*      MCV 83.8   MCH 27.1   MCHC 32.3     I have  personallly reviewed all pertinent lab results from the last 24 hours.    Assessment/Plan:     22 y.o. 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    Rick Landaverde MD  Obstetrics  [facility]

## 2023-05-03 VITALS
HEART RATE: 107 BPM | DIASTOLIC BLOOD PRESSURE: 90 MMHG | TEMPERATURE: 98 F | OXYGEN SATURATION: 99 % | SYSTOLIC BLOOD PRESSURE: 140 MMHG | HEIGHT: 63 IN | RESPIRATION RATE: 20 BRPM | BODY MASS INDEX: 47.73 KG/M2 | WEIGHT: 269.38 LBS

## 2023-05-03 PROCEDURE — 25000003 PHARM REV CODE 250: Performed by: OBSTETRICS & GYNECOLOGY

## 2023-05-03 RX ORDER — OXYCODONE AND ACETAMINOPHEN 7.5; 325 MG/1; MG/1
1 TABLET ORAL EVERY 6 HOURS PRN
Qty: 12 TABLET | Refills: 0 | Status: SHIPPED | OUTPATIENT
Start: 2023-05-03

## 2023-05-03 RX ORDER — TRIPROLIDINE/PSEUDOEPHEDRINE 2.5MG-60MG
30 TABLET ORAL EVERY 6 HOURS PRN
Qty: 480 ML | Refills: 2 | Status: SHIPPED | OUTPATIENT
Start: 2023-05-03

## 2023-05-03 RX ADMIN — IBUPROFEN 600 MG: 600 TABLET, FILM COATED ORAL at 12:05

## 2023-05-03 RX ADMIN — IBUPROFEN 600 MG: 600 TABLET, FILM COATED ORAL at 05:05

## 2023-05-03 NOTE — DISCHARGE SUMMARY
[facility]  [facility]      Patient Name: Chiquita Montelongo  MRN: [id number]  Admission Date: 2023  Hospital Length of Stay: 2 days  Discharge Date and Time:  2023 5:08 PM  Attending Physician: Dr. Keith Kang  Discharging Provider: MO SIMMONS  Primary Care Provider: Primary Doctor Ernestine    HPI:  She is doing well this morning and desires discharge. Baby has been transferred d/t respiratory issues. She is tolerating a regular diet without nausea or vomiting. She is voiding spontaneously. She is ambulating. She has passed flatus, and has not a BM. Vaginal bleeding is mild. She denies fever or chills. Abdominal pain is mild and controlled with oral medications. She Is bottle feeding    Procedure(s) (LRB):   SECTION (N/A)     Hospital Course: Pt is a 31y/o female , s/p Primary , PP day #2. Pt presented to the hospital at 36w1d with PROM and cervical cerclage in place. Pt then had failure to progress d/t arrest of dilation was subsequently underwent a primary  per Dr. Kang. She delivered a viable male on 23 at 2114. Routine postpartum and post op care were initiated.         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] 2023 Unknown    Delivery of pregnancy by  section [O82] 2023 Unknown    Incompetent cervix in pregnancy, antepartum, second trimester [O34.32] 2023 Yes    Obesity complicating pregnancy in third trimester [O99.213] 2023 Yes      Problems Resolved During this Admission:        Feeding Method: bottle    Immunizations       None            Delivery:     Episiotomy:     Lacerations:     Repair suture:     Repair # of packets:     Blood loss (ml):       Birth information:  YOB: 2022   Time of birth: 9:14 PM   Sex: male   Delivery type: , Low Transverse   Gestational Age: 36w1d    Delivery Clinician:      Other providers:       Additional  information:  Forceps:    Vacuum:    Breech:    Observed anomalies      Living?:           APGARS  One minute Five minutes Ten minutes   Skin color:         Heart rate:         Grimace:         Muscle tone:         Breathing:         Totals: 5  7  8      Placenta: Delivered:       appearance  Pending Diagnostic Studies:       None            Discharged Condition: good    Disposition: Home or Self Care    Follow Up:   Follow-up Information       RACHEL KANG MD Follow up.    Specialty: Obstetrics and Gynecology  Why: The patient will followup with Dr. Kang on Tuesday, May 16 @ 9:45 AM.  Contact information:  [address] [place] [address]  [place] [address] [phone]                           Patient Instructions:      Diet Adult Regular     Pelvic Rest     Lifting restrictions     Activity as tolerated     Medications:  Discharge Medication List as of 5/3/2023  9:17 AM        START taking these medications    Details   ibuprofen 20 mg/mL oral liquid Take 30 mLs (600 mg total) by mouth every 6 (six) hours as needed for Pain., Starting Wed 5/3/2023, Normal      oxyCODONE-acetaminophen (PERCOCET) 7.5-325 mg per tablet Take 1 tablet by mouth every 6 (six) hours as needed for Pain., Starting Wed 5/3/2023, Normal           CONTINUE these medications which have NOT CHANGED    Details   VITAFOL ULTRA 29 mg iron- 1 mg-200 mg Cap Take 1 capsule by mouth once daily., Starting Tue 3/28/2023, Normal           STOP taking these medications       aspirin (ECOTRIN) 81 MG EC tablet Comments:   Reason for Stopping:         iron-vit c-b12-folic acid (ICAR-C PLUS) Tab Comments:   Reason for Stopping:         progesterone  (PROMETRIUM) 200 MG capsule Comments:   Reason for Stopping:               MO SIMMONS  Obstetrics  [facility]

## 2023-05-05 ENCOUNTER — HOSPITAL ENCOUNTER (EMERGENCY)
Facility: HOSPITAL | Age: 23
Discharge: HOME | End: 2023-05-05
Payer: MEDICAID

## 2023-05-05 VITALS — DIASTOLIC BLOOD PRESSURE: 73 MMHG | HEART RATE: 103 BPM | SYSTOLIC BLOOD PRESSURE: 135 MMHG | OXYGEN SATURATION: 98 %

## 2023-05-05 PROBLEM — R10.9 ABDOMINAL PAIN: Status: ACTIVE | Noted: 2023-05-05

## 2023-05-05 PROCEDURE — 99282 EMERGENCY DEPT VISIT SF MDM: CPT

## 2023-05-05 NOTE — ED TRIAGE NOTES
Pt given and explained discharge instructions. Rpts understanding and rpts pain completely gone-pt ambulatory back to NICU

## 2023-05-05 NOTE — ED TRIAGE NOTES
Pt was visiting her baby in NICU and  started having severe abdominal and chest pain constantly.  Was brought to labor and delivery- rpts only taking motrin last night-hasnt taken any percocet since being d/sarai-delivered c/s on may 1

## 2023-05-05 NOTE — ED PROVIDER NOTES
[facility] NOTE  [facility]     Admit Date: 2023  REN[facility] Physician: Juliocesar Payton      Admit Diagnosis/Chief Complaint:   Abdominal pain   S/p c/s on 2023  Discharge Diagnosis:    same    Chief Complaint   Patient presents with    Abdominal Pain     C/S May 1-severe abdominal and chest pain-[facility]       Hospital History and Physical:  Chiquita Montelongo is a 22 y.o.  s/p c/s on 2023 who presented with upper abdominal pain that was relieved with movement and gas.  Patient denies any fevers, chills, nausea or vomiting.  Was at facility visiting baby in NICU.     There are no hospital problems to display for this patient.        /73   Pulse 103   LMP 2022 (Approximate)   SpO2 98%   Pulse:  [103-110] 103  SpO2:  [98 %-99 %] 98 %  BP: (135)/(73) 135/73    General: alert and cooperative  Cardiovascular: rate and rhythm, S1, S2 normal   Respiratory: clear to auscultation bilaterally  Abdominal: gravid, non-tender  Extremeties: non-tender, no edema        LABS:   No results found for this or any previous visit (from the past 24 hour(s)).    Imaging Results    None          ASSESMENT/CLINICAL IMPRESSION:    Abdominal pain gas in nature  S/p c/s on 2023  reassurance          Disposition:  discharged to home    Medications:   none    Patient Instructions:   - Pt was given routine pregnancy instructions including to return to triage if she had any vaginal bleeding (other than spotting for the next 48hrs), any loss of fluid like her water broke, decreased fetal movement, or contractions Q 5min lasting for 2 or more hours. Pt was also instructed to drink copious water. Patient voiced understanding of all these instructions and was subsequently discharged home. Tylenol use and maternity belt use discussed. All questions answered. Pt left REN[facility]E with good understanding of plan.     She will follow up with her primary OB as scheduled      Juliocesar Payton MD  OB-GYN  Hospitalist

## 2023-05-15 ENCOUNTER — TELEPHONE (OUTPATIENT)
Dept: OBSTETRICS AND GYNECOLOGY | Facility: CLINIC | Age: 23
End: 2023-05-15
Payer: MEDICAID

## 2023-05-15 NOTE — TELEPHONE ENCOUNTER
Called patient with no answer, Left VM explaining reason of calling to check on her and baby in NICU.

## 2023-05-22 ENCOUNTER — PATIENT MESSAGE (OUTPATIENT)
Dept: OBSTETRICS AND GYNECOLOGY | Facility: CLINIC | Age: 23
End: 2023-05-22
Payer: MEDICAID

## (undated) DEVICE — GLOVE 7.5 PROTEXIS PI BLUE

## (undated) DEVICE — SUT VICRYL COAT 910 1X36IN

## (undated) DEVICE — PAD PREP CUFFED NS 24X48IN

## (undated) DEVICE — DRAPE HALF SURGICAL 40X58IN

## (undated) DEVICE — SPONGE LAP XRAY DTECT 18X18IN

## (undated) DEVICE — GLOVE 8.0 PROTEXIS PI MICRO

## (undated) DEVICE — GLOVE PROTEXIS PI SYN SURG 7.5

## (undated) DEVICE — RETRACTOR TRAXI PANNICULUS

## (undated) DEVICE — PACK C-SECTION CUSTOM

## (undated) DEVICE — PAD UNDERPAD 30X30

## (undated) DEVICE — HEMOSTAT SURGICEL PWD 3G

## (undated) DEVICE — SUT 0 27IN CHROMIC GUT CT-1

## (undated) DEVICE — DRAPE UND BUTT W/POUCH 40X44IN

## (undated) DEVICE — SET SINGLE BASIN JENNINGS

## (undated) DEVICE — GOWN SMARTGOWN 3XL XLONG

## (undated) DEVICE — TOWEL OR DISP STRL BLUE 4/PK

## (undated) DEVICE — SUT MONO 1 CTX VIL MONO

## (undated) DEVICE — DRAPE LEGGINGS CUFF 33X51IN

## (undated) DEVICE — SUT 3/0 27IN COATED VICRYL

## (undated) DEVICE — APPLICATOR CHLORAPREP ORN 26ML

## (undated) DEVICE — DRESSING TRANS 4X10 TEGADERM

## (undated) DEVICE — GOWN SMARTGOWN RAGLAN BLUE XL

## (undated) DEVICE — TRAY DRY SKIN SCRUB PREP

## (undated) DEVICE — ADHESIVE DERMABOND ADVANCED

## (undated) DEVICE — SUT CHROMIC GUT 0 18IN

## (undated) DEVICE — TRAY CATH URETHRAL FOLEY 16FR

## (undated) DEVICE — Device